# Patient Record
Sex: MALE | Race: WHITE | NOT HISPANIC OR LATINO
[De-identification: names, ages, dates, MRNs, and addresses within clinical notes are randomized per-mention and may not be internally consistent; named-entity substitution may affect disease eponyms.]

---

## 2017-12-25 ENCOUNTER — HOSPITAL (OUTPATIENT)
Dept: OTHER | Age: 42
End: 2017-12-25
Attending: INTERNAL MEDICINE

## 2017-12-25 LAB
ANALYZER ANC (IANC): NORMAL
ANION GAP SERPL CALC-SCNC: 13 MMOL/L (ref 10–20)
BASOPHILS # BLD: 0 THOUSAND/MCL (ref 0–0.3)
BASOPHILS NFR BLD: 0 %
BUN SERPL-MCNC: 17 MG/DL (ref 6–20)
BUN/CREAT SERPL: 19 (ref 7–25)
CALCIUM SERPL-MCNC: 8.6 MG/DL (ref 8.4–10.2)
CHLORIDE: 103 MMOL/L (ref 98–107)
CO2 SERPL-SCNC: 25 MMOL/L (ref 21–32)
CREAT SERPL-MCNC: 0.88 MG/DL (ref 0.67–1.17)
CRP SERPL-MCNC: 0.7 MG/DL
DIFFERENTIAL METHOD BLD: NORMAL
EOSINOPHIL # BLD: 0.3 THOUSAND/MCL (ref 0.1–0.5)
EOSINOPHIL NFR BLD: 5 %
ERYTHROCYTE [DISTWIDTH] IN BLOOD: 12.2 % (ref 11–15)
ERYTHROCYTE [SEDIMENTATION RATE] IN BLOOD BY WESTERGREN METHOD: 19 MM/HR (ref 0–20)
GLUCOSE BLDC GLUCOMTR-MCNC: 202 MG/DL (ref 65–99)
GLUCOSE SERPL-MCNC: 285 MG/DL (ref 65–99)
HEMATOCRIT: 40.8 % (ref 39–51)
HGB BLD-MCNC: 14.2 GM/DL (ref 13–17)
LYMPHOCYTES # BLD: 1.8 THOUSAND/MCL (ref 1–4.8)
LYMPHOCYTES NFR BLD: 32 %
MCH RBC QN AUTO: 30.1 PG (ref 26–34)
MCHC RBC AUTO-ENTMCNC: 34.8 GM/DL (ref 32–36.5)
MCV RBC AUTO: 86.6 FL (ref 78–100)
MONOCYTES # BLD: 0.4 THOUSAND/MCL (ref 0.3–0.9)
MONOCYTES NFR BLD: 7 %
NEUTROPHILS # BLD: 3.2 THOUSAND/MCL (ref 1.8–7.7)
NEUTROPHILS NFR BLD: 56 %
NEUTS SEG NFR BLD: NORMAL %
PERCENT NRBC: NORMAL
PLATELET # BLD: 171 THOUSAND/MCL (ref 140–450)
POTASSIUM SERPL-SCNC: 3.8 MMOL/L (ref 3.4–5.1)
RBC # BLD: 4.71 MILLION/MCL (ref 4.5–5.9)
SODIUM SERPL-SCNC: 137 MMOL/L (ref 135–145)
URATE SERPL-MCNC: 3.2 MG/DL (ref 3.5–7.2)
WBC # BLD: 5.7 THOUSAND/MCL (ref 4.2–11)

## 2024-09-04 ENCOUNTER — LAB (OUTPATIENT)
Dept: FAMILY MEDICINE CLINIC | Facility: CLINIC | Age: 49
End: 2024-09-04
Payer: COMMERCIAL

## 2024-09-04 ENCOUNTER — OFFICE VISIT (OUTPATIENT)
Dept: FAMILY MEDICINE CLINIC | Facility: CLINIC | Age: 49
End: 2024-09-04
Payer: COMMERCIAL

## 2024-09-04 VITALS
HEIGHT: 70 IN | WEIGHT: 202 LBS | SYSTOLIC BLOOD PRESSURE: 132 MMHG | DIASTOLIC BLOOD PRESSURE: 74 MMHG | HEART RATE: 85 BPM | OXYGEN SATURATION: 95 % | BODY MASS INDEX: 28.92 KG/M2

## 2024-09-04 DIAGNOSIS — M54.2 CERVICALGIA: ICD-10-CM

## 2024-09-04 DIAGNOSIS — N52.9 ERECTILE DYSFUNCTION, UNSPECIFIED ERECTILE DYSFUNCTION TYPE: ICD-10-CM

## 2024-09-04 DIAGNOSIS — Z12.11 SCREENING FOR MALIGNANT NEOPLASM OF COLON: ICD-10-CM

## 2024-09-04 DIAGNOSIS — E10.65 TYPE 1 DIABETES MELLITUS WITH HYPERGLYCEMIA: ICD-10-CM

## 2024-09-04 DIAGNOSIS — Z00.00 PREVENTATIVE HEALTH CARE: ICD-10-CM

## 2024-09-04 DIAGNOSIS — E78.2 MIXED HYPERLIPIDEMIA: Primary | ICD-10-CM

## 2024-09-04 DIAGNOSIS — M35.0C SJOGREN'S SYNDROME WITH DENTAL INVOLVEMENT: ICD-10-CM

## 2024-09-04 PROCEDURE — 99204 OFFICE O/P NEW MOD 45 MIN: CPT | Performed by: NURSE PRACTITIONER

## 2024-09-04 PROCEDURE — 86803 HEPATITIS C AB TEST: CPT | Performed by: NURSE PRACTITIONER

## 2024-09-04 PROCEDURE — 80061 LIPID PANEL: CPT | Performed by: NURSE PRACTITIONER

## 2024-09-04 PROCEDURE — 80050 GENERAL HEALTH PANEL: CPT | Performed by: NURSE PRACTITIONER

## 2024-09-04 PROCEDURE — 36415 COLL VENOUS BLD VENIPUNCTURE: CPT | Performed by: NURSE PRACTITIONER

## 2024-09-04 RX ORDER — INSULIN LISPRO 100 [IU]/ML
INJECTION, SOLUTION INTRAVENOUS; SUBCUTANEOUS
COMMUNITY
Start: 2024-07-26

## 2024-09-04 RX ORDER — EPINEPHRINE 0.3 MG/.3ML
INJECTION SUBCUTANEOUS
COMMUNITY
Start: 2024-07-25

## 2024-09-04 RX ORDER — PROCHLORPERAZINE 25 MG/1
SUPPOSITORY RECTAL
COMMUNITY
Start: 2024-07-25

## 2024-09-04 RX ORDER — TADALAFIL 20 MG/1
20 TABLET ORAL DAILY PRN
COMMUNITY
Start: 2024-07-26

## 2024-09-04 RX ORDER — HYDROCODONE BITARTRATE AND ACETAMINOPHEN 5; 325 MG/1; MG/1
1 TABLET ORAL
COMMUNITY
Start: 2024-08-29 | End: 2024-09-28

## 2024-09-04 RX ORDER — PREGABALIN 75 MG/1
75 CAPSULE ORAL
COMMUNITY
Start: 2024-07-30 | End: 2024-10-28

## 2024-09-04 RX ORDER — ATORVASTATIN CALCIUM 40 MG/1
40 TABLET, FILM COATED ORAL NIGHTLY
COMMUNITY
Start: 2024-07-24

## 2024-09-04 RX ORDER — PROCHLORPERAZINE 25 MG/1
SUPPOSITORY RECTAL
COMMUNITY
Start: 2024-07-03

## 2024-09-04 RX ORDER — SERTRALINE HYDROCHLORIDE 100 MG/1
TABLET, FILM COATED ORAL
COMMUNITY
Start: 2024-07-24

## 2024-09-04 RX ORDER — SEMAGLUTIDE 0.68 MG/ML
0.5 INJECTION, SOLUTION SUBCUTANEOUS
COMMUNITY

## 2024-09-04 NOTE — PROGRESS NOTES
"Chief Complaint  Establish Care (Moved to the area in June from [place] and needing a PCP. General check up)    Anamika Cooney presents to [facility]  History of Present Illness    Patient presents to establish care.  Past medical history includes Sjogren's syndrome, depression, type 1 diabetes and hyperlipidemia. Patient has Omnipod and taking Ozempic 0.5 mg weekly. Last A1C 7.8. He is followed by Endocrinology. Patient is taking Rituximab every 6 months. He is followed by Rheumatology with Ar. Patient is taking Lipitor 40 mg daily for hyperlipidemia. Hx pinched nerve to c-spine, taking Norco PRN and Lyrica 75 mg PRN. Patient followed by Dr. Castaneda and pain management. Depression and anxiety stable on Zoloft 150 mg daily. Hx ED, stable on Cialis PRN .    PHQ-9 Depression Screening  Little interest or pleasure in doing things? 0-->not at all   Feeling down, depressed, or hopeless? 0-->not at all   Trouble falling or staying asleep, or sleeping too much?     Feeling tired or having little energy?     Poor appetite or overeating?     Feeling bad about yourself - or that you are a failure or have let yourself or your family down?     Trouble concentrating on things, such as reading the newspaper or watching television?     Moving or speaking so slowly that other people could have noticed? Or the opposite - being so fidgety or restless that you have been moving around a lot more than usual?     Thoughts that you would be better off dead, or of hurting yourself in some way?     PHQ-9 Total Score 0   If you checked off any problems, how difficult have these problems made it for you to do your work, take care of things at home, or get along with other people?        Objective   Vital Signs:  /74 (BP Location: Left arm, Patient Position: Sitting, Cuff Size: Adult)   Pulse 85   Ht 177.8 cm (70\")   Wt 91.6 kg (202 lb)   SpO2 95%   BMI 28.98 kg/m²   Estimated body " "mass index is 28.98 kg/m² as calculated from the following:    Height as of this encounter: 177.8 cm (70\").    Weight as of this encounter: 91.6 kg (202 lb).    BMI is >= 25 and <30. (Overweight) The following options were offered after discussion;: exercise counseling/recommendations and nutrition counseling/recommendations      Physical Exam  Constitutional:       Appearance: Normal appearance.   HENT:      Head: Normocephalic.   Cardiovascular:      Rate and Rhythm: Normal rate and regular rhythm.   Pulmonary:      Effort: Pulmonary effort is normal.      Breath sounds: Normal breath sounds.   Abdominal:      General: Abdomen is flat. Bowel sounds are normal.      Palpations: Abdomen is soft.   Musculoskeletal:         General: Normal range of motion.      Cervical back: Neck supple.      Right lower leg: No edema.      Left lower leg: No edema.   Skin:     General: Skin is warm and dry.   Neurological:      Mental Status: He is alert and oriented to person, place, and time.      Gait: Gait is intact.   Psychiatric:         Attention and Perception: Attention normal.         Mood and Affect: Mood normal.         Speech: Speech normal.        Result Review :      Data reviewed : Consultant notes Endo/Pain management/Rheumatology           Assessment and Plan   Diagnoses and all orders for this visit:    1. Mixed hyperlipidemia (Primary)  Assessment & Plan:  Labs today  Continue Lipitor as prescribed      2. Erectile dysfunction, unspecified erectile dysfunction type  Assessment & Plan:  Cialis PRN      3. Type 1 diabetes mellitus with hyperglycemia  Assessment & Plan:  Continue Humalog and Ozempic per Endo  Visit note reviewed  ADA diet  Schedule eye exam      4. Sjogren's syndrome with dental involvement  Assessment & Plan:  Rituximab infusions per Rheumatology  Follow up as recommended      5. Preventative health care  -     CBC (No Diff)  -     Comprehensive Metabolic Panel  -     Lipid Panel  -     TSH  -     " Hepatitis C Antibody    6. Screening for malignant neoplasm of colon  -     Cologuard - Stool, Per Rectum; Future    7. Cervicalgia  Assessment & Plan:  Norco and Lyrica per pain management, follow up as instructed             I spent 30 minutes caring for Ji on this date of service. This time includes time spent by me in the following activities:preparing for the visit, reviewing tests, obtaining and/or reviewing a separately obtained history, performing a medically appropriate examination and/or evaluation , counseling and educating the patient/family/caregiver, ordering medications, tests, or procedures, documenting information in the medical record, and care coordination  Follow Up   Return in about 6 months (around 3/4/2025) for Annual physical.  Patient was given instructions and counseling regarding his condition or for health maintenance advice. Please see specific information pulled into the AVS if appropriate.

## 2024-09-05 LAB
ALBUMIN SERPL-MCNC: 4.1 G/DL (ref 3.5–5.2)
ALBUMIN/GLOB SERPL: 1.9 G/DL
ALP SERPL-CCNC: 128 U/L (ref 39–117)
ALT SERPL W P-5'-P-CCNC: 59 U/L (ref 1–41)
ANION GAP SERPL CALCULATED.3IONS-SCNC: 11 MMOL/L (ref 5–15)
AST SERPL-CCNC: 41 U/L (ref 1–40)
BILIRUB SERPL-MCNC: 0.3 MG/DL (ref 0–1.2)
BUN SERPL-MCNC: 14 MG/DL (ref 6–20)
BUN/CREAT SERPL: 15.1 (ref 7–25)
CALCIUM SPEC-SCNC: 9.2 MG/DL (ref 8.6–10.5)
CHLORIDE SERPL-SCNC: 103 MMOL/L (ref 98–107)
CHOLEST SERPL-MCNC: 145 MG/DL (ref 0–200)
CO2 SERPL-SCNC: 23 MMOL/L (ref 22–29)
CREAT SERPL-MCNC: 0.93 MG/DL (ref 0.76–1.27)
DEPRECATED RDW RBC AUTO: 44 FL (ref 37–54)
EGFRCR SERPLBLD CKD-EPI 2021: 101.9 ML/MIN/1.73
ERYTHROCYTE [DISTWIDTH] IN BLOOD BY AUTOMATED COUNT: 13 % (ref 12.3–15.4)
GLOBULIN UR ELPH-MCNC: 2.2 GM/DL
GLUCOSE SERPL-MCNC: 266 MG/DL (ref 65–99)
HCT VFR BLD AUTO: 45.2 % (ref 37.5–51)
HCV AB SER QL: NORMAL
HDLC SERPL-MCNC: 29 MG/DL (ref 40–60)
HGB BLD-MCNC: 14.6 G/DL (ref 13–17.7)
LDLC SERPL CALC-MCNC: 81 MG/DL (ref 0–100)
LDLC/HDLC SERPL: 2.56 {RATIO}
MCH RBC QN AUTO: 30 PG (ref 26.6–33)
MCHC RBC AUTO-ENTMCNC: 32.3 G/DL (ref 31.5–35.7)
MCV RBC AUTO: 93 FL (ref 79–97)
PLATELET # BLD AUTO: 220 10*3/MM3 (ref 140–450)
PMV BLD AUTO: 10.4 FL (ref 6–12)
POTASSIUM SERPL-SCNC: 4 MMOL/L (ref 3.5–5.2)
PROT SERPL-MCNC: 6.3 G/DL (ref 6–8.5)
RBC # BLD AUTO: 4.86 10*6/MM3 (ref 4.14–5.8)
SODIUM SERPL-SCNC: 137 MMOL/L (ref 136–145)
TRIGL SERPL-MCNC: 209 MG/DL (ref 0–150)
TSH SERPL DL<=0.05 MIU/L-ACNC: 1.15 UIU/ML (ref 0.27–4.2)
VLDLC SERPL-MCNC: 35 MG/DL (ref 5–40)
WBC NRBC COR # BLD AUTO: 4.71 10*3/MM3 (ref 3.4–10.8)

## 2024-09-05 NOTE — PROGRESS NOTES
Please let patient know that his triglycerides are little bit elevated with the remainder of his cholesterol panel looks good.  Continue Lipitor as prescribed as well as a healthy heart diet.  His liver enzymes are elevated.  Please ask him if he has had a liver ultrasound in the past.  The rest of his labs are normal

## 2024-09-06 DIAGNOSIS — R79.89 ELEVATED LFTS: Primary | ICD-10-CM

## 2024-09-19 ENCOUNTER — HOSPITAL ENCOUNTER (OUTPATIENT)
Dept: ULTRASOUND IMAGING | Facility: HOSPITAL | Age: 49
Discharge: HOME | End: 2024-09-19
Admitting: NURSE PRACTITIONER
Payer: COMMERCIAL

## 2024-09-19 DIAGNOSIS — R79.89 ELEVATED LFTS: ICD-10-CM

## 2024-09-19 PROCEDURE — 76705 ECHO EXAM OF ABDOMEN: CPT

## 2024-10-29 RX ORDER — SEMAGLUTIDE 0.68 MG/ML
0.5 INJECTION, SOLUTION SUBCUTANEOUS
OUTPATIENT
Start: 2024-10-29

## 2024-10-29 RX ORDER — SERTRALINE HYDROCHLORIDE 100 MG/1
150 TABLET, FILM COATED ORAL DAILY
Qty: 90 TABLET | Refills: 1 | Status: SHIPPED | OUTPATIENT
Start: 2024-10-29

## 2024-10-29 RX ORDER — TADALAFIL 20 MG/1
20 TABLET ORAL DAILY PRN
Qty: 90 TABLET | Refills: 0 | Status: SHIPPED | OUTPATIENT
Start: 2024-10-29

## 2024-12-13 RX ORDER — SERTRALINE HYDROCHLORIDE 100 MG/1
150 TABLET, FILM COATED ORAL DAILY
Qty: 135 TABLET | Refills: 1 | Status: SHIPPED | OUTPATIENT
Start: 2024-12-13

## 2024-12-13 RX ORDER — TADALAFIL 20 MG/1
20 TABLET ORAL DAILY PRN
Qty: 90 TABLET | Refills: 0 | Status: SHIPPED | OUTPATIENT
Start: 2024-12-13

## 2025-01-31 ENCOUNTER — OFFICE VISIT (OUTPATIENT)
Dept: FAMILY MEDICINE CLINIC | Facility: CLINIC | Age: 50
End: 2025-01-31
Payer: COMMERCIAL

## 2025-01-31 VITALS
WEIGHT: 202 LBS | OXYGEN SATURATION: 98 % | HEART RATE: 61 BPM | BODY MASS INDEX: 28.92 KG/M2 | SYSTOLIC BLOOD PRESSURE: 124 MMHG | DIASTOLIC BLOOD PRESSURE: 70 MMHG | HEIGHT: 70 IN

## 2025-01-31 DIAGNOSIS — F32.A ANXIETY AND DEPRESSION: Primary | ICD-10-CM

## 2025-01-31 DIAGNOSIS — F41.1 GAD (GENERALIZED ANXIETY DISORDER): ICD-10-CM

## 2025-01-31 DIAGNOSIS — Z00.00 PREVENTATIVE HEALTH CARE: ICD-10-CM

## 2025-01-31 DIAGNOSIS — Z23 NEED FOR INFLUENZA VACCINATION: ICD-10-CM

## 2025-01-31 DIAGNOSIS — F41.9 ANXIETY AND DEPRESSION: Primary | ICD-10-CM

## 2025-01-31 RX ORDER — ALPRAZOLAM 0.25 MG/1
0.25 TABLET ORAL DAILY PRN
Qty: 30 TABLET | Refills: 0 | Status: SHIPPED | OUTPATIENT
Start: 2025-01-31

## 2025-01-31 NOTE — ASSESSMENT & PLAN NOTE
Continue Zoloft 150 mg daily  Start Xanax 0.25 mg daily as needed for severe anxiety or panic  Follow-up on March 5 as scheduled  Call sooner if needed
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
## 2025-03-05 ENCOUNTER — OFFICE VISIT (OUTPATIENT)
Dept: FAMILY MEDICINE CLINIC | Facility: CLINIC | Age: 50
End: 2025-03-05
Payer: COMMERCIAL

## 2025-03-05 ENCOUNTER — LAB (OUTPATIENT)
Dept: FAMILY MEDICINE CLINIC | Facility: CLINIC | Age: 50
End: 2025-03-05
Payer: COMMERCIAL

## 2025-03-05 VITALS
OXYGEN SATURATION: 99 % | BODY MASS INDEX: 27.77 KG/M2 | HEIGHT: 70 IN | DIASTOLIC BLOOD PRESSURE: 70 MMHG | HEART RATE: 68 BPM | SYSTOLIC BLOOD PRESSURE: 124 MMHG | WEIGHT: 194 LBS

## 2025-03-05 DIAGNOSIS — Z00.00 ANNUAL PHYSICAL EXAM: ICD-10-CM

## 2025-03-05 DIAGNOSIS — F32.A ANXIETY AND DEPRESSION: Primary | ICD-10-CM

## 2025-03-05 DIAGNOSIS — F41.9 ANXIETY AND DEPRESSION: Primary | ICD-10-CM

## 2025-03-05 DIAGNOSIS — N52.9 ERECTILE DYSFUNCTION, UNSPECIFIED ERECTILE DYSFUNCTION TYPE: ICD-10-CM

## 2025-03-05 DIAGNOSIS — E78.2 MIXED HYPERLIPIDEMIA: ICD-10-CM

## 2025-03-05 DIAGNOSIS — E10.65 TYPE 1 DIABETES MELLITUS WITH HYPERGLYCEMIA: ICD-10-CM

## 2025-03-05 PROBLEM — F41.1 GAD (GENERALIZED ANXIETY DISORDER): Status: ACTIVE | Noted: 2025-03-05

## 2025-03-05 PROCEDURE — 82043 UR ALBUMIN QUANTITATIVE: CPT | Performed by: NURSE PRACTITIONER

## 2025-03-05 PROCEDURE — 80061 LIPID PANEL: CPT | Performed by: NURSE PRACTITIONER

## 2025-03-05 PROCEDURE — 82570 ASSAY OF URINE CREATININE: CPT | Performed by: NURSE PRACTITIONER

## 2025-03-05 PROCEDURE — 83036 HEMOGLOBIN GLYCOSYLATED A1C: CPT | Performed by: NURSE PRACTITIONER

## 2025-03-05 PROCEDURE — 80050 GENERAL HEALTH PANEL: CPT | Performed by: NURSE PRACTITIONER

## 2025-03-05 PROCEDURE — 36415 COLL VENOUS BLD VENIPUNCTURE: CPT | Performed by: NURSE PRACTITIONER

## 2025-03-05 RX ORDER — ATORVASTATIN CALCIUM 40 MG/1
40 TABLET, FILM COATED ORAL NIGHTLY
Qty: 90 TABLET | Refills: 3 | Status: SHIPPED | OUTPATIENT
Start: 2025-03-05

## 2025-03-05 NOTE — ASSESSMENT & PLAN NOTE
Patient's depression is a recurrent episode that is mild without psychosis. Depression is in partial remission and improving with treatment.    Plan:   Continue current medication therapy     Followup in 6 months.

## 2025-03-05 NOTE — ASSESSMENT & PLAN NOTE
Lipid abnormalities are improving with treatment    Plan:  Continue same medication/s without change.      Discussed medication dosage, use, side effects, and goals of treatment in detail.    Counseled patient on lifestyle modifications to help control hyperlipidemia.   Advised patient to exercise for 150 minutes weekly. (30 minute brisk walk, 5 days a week for example)    Patient Treatment Goals:   LDL goal is under 100    Followup  labs today .

## 2025-03-05 NOTE — ASSESSMENT & PLAN NOTE
Diabetes is improving with treatment.   Continue current treatment regimen.  Recommended an ADA diet.  Reminded to get yearly retinal exam.  Diabetes will be reassessed  per Endocrinology

## 2025-03-05 NOTE — PROGRESS NOTES
"Chief Complaint  Annual Exam and Follow-up (Follow up of anxiety/depression )    Anamika Cooney presents to [facility]  History of Present Illness    Patient presents for Annual Exam.  Medical history includes type 1 diabetes, depression, anxiety, Sjogren's syndrome and hyperlipidemia.Patient has Omnipod and taking Ozempic 1 mg weekly. Last A1C 8.1. He is followed by Endocrinology. Patient is taking Rituximab every 6 months for Sjogren's syndrome. He is followed by Rheumatology with Ar. Patient is taking Lipitor 40 mg daily for hyperlipidemia. Hx pinched nerve to c-spine, taking Norco PRN and Lyrica 75 mg PRN. Patient followed by Dr. Castaneda and pain management. Depression and anxiety stable on Zoloft 150 mg daily. Xanax recently added for PRN use as well.  Hx ED, stable on Cialis PRN .     PHQ-9 Depression Screening  Little interest or pleasure in doing things? Not at all   Feeling down, depressed, or hopeless? Not at all   PHQ-2 Total Score 0   Trouble falling or staying asleep, or sleeping too much?     Feeling tired or having little energy?     Poor appetite or overeating?     Feeling bad about yourself - or that you are a failure or have let yourself or your family down?     Trouble concentrating on things, such as reading the newspaper or watching television?     Moving or speaking so slowly that other people could have noticed? Or the opposite - being so fidgety or restless that you have been moving around a lot more than usual?     Thoughts that you would be better off dead, or of hurting yourself in some way?     PHQ-9 Total Score     If you checked off any problems, how difficult have these problems made it for you to do your work, take care of things at home, or get along with other people? Not difficult at all      Objective   Vital Signs:  /70 (BP Location: Left arm, Patient Position: Sitting, Cuff Size: Adult)   Pulse 68   Ht 177.8 cm (70\")   " "Wt 88 kg (194 lb)   SpO2 99%   BMI 27.84 kg/m²   Estimated body mass index is 27.84 kg/m² as calculated from the following:    Height as of this encounter: 177.8 cm (70\").    Weight as of this encounter: 88 kg (194 lb).          Physical Exam  Constitutional:       Appearance: Normal appearance.   HENT:      Head: Normocephalic.      Right Ear: Tympanic membrane normal.      Left Ear: Tympanic membrane normal.      Mouth/Throat:      Pharynx: Oropharynx is clear.   Cardiovascular:      Rate and Rhythm: Normal rate and regular rhythm.   Pulmonary:      Effort: Pulmonary effort is normal.      Breath sounds: Normal breath sounds.   Abdominal:      General: Abdomen is flat. Bowel sounds are normal.      Palpations: Abdomen is soft.   Musculoskeletal:         General: Normal range of motion.      Cervical back: Neck supple.      Right lower leg: No edema.      Left lower leg: No edema.   Skin:     General: Skin is warm and dry.   Neurological:      Mental Status: He is alert and oriented to person, place, and time.      Gait: Gait is intact.   Psychiatric:         Attention and Perception: Attention normal.         Mood and Affect: Mood normal.         Speech: Speech normal.        Result Review :                Assessment and Plan   Diagnoses and all orders for this visit:    1. Anxiety and depression (Primary)  Assessment & Plan:  Patient's depression is a recurrent episode that is mild without psychosis. Depression is in partial remission and improving with treatment.    Plan:   Continue current medication therapy     Followup in 6 months.       2. Type 1 diabetes mellitus with hyperglycemia  Assessment & Plan:  Diabetes is improving with treatment.   Continue current treatment regimen.  Recommended an ADA diet.  Reminded to get yearly retinal exam.  Diabetes will be reassessed  per Endocrinology    Orders:  -     Microalbumin / Creatinine Urine Ratio - Urine, Clean Catch  -     Hemoglobin A1c    3. Mixed " hyperlipidemia  Assessment & Plan:   Lipid abnormalities are improving with treatment    Plan:  Continue same medication/s without change.      Discussed medication dosage, use, side effects, and goals of treatment in detail.    Counseled patient on lifestyle modifications to help control hyperlipidemia.   Advised patient to exercise for 150 minutes weekly. (30 minute brisk walk, 5 days a week for example)    Patient Treatment Goals:   LDL goal is under 100    Followup  labs today .    Orders:  -     atorvastatin (LIPITOR) 40 MG tablet; Take 1 tablet by mouth Every Night.  Dispense: 90 tablet; Refill: 3  -     Lipid Panel    4. Erectile dysfunction, unspecified erectile dysfunction type  Assessment & Plan:  Stable on Cialis PRN      5. Annual physical exam  Assessment & Plan:  Labs today  Routine vision and dental screenings advised  Well balanced diet recommended  CDC recommends 150 minutes exercise weekly  Colon CA screening up to date    Orders:  -     CBC (No Diff)  -     Comprehensive Metabolic Panel  -     Hemoglobin A1c  -     Lipid Panel  -     TSH           I spent 30 minutes caring for Ji on this date of service. This time includes time spent by me in the following activities:preparing for the visit, reviewing tests, obtaining and/or reviewing a separately obtained history, performing a medically appropriate examination and/or evaluation , counseling and educating the patient/family/caregiver, ordering medications, tests, or procedures, documenting information in the medical record, and care coordination  Follow Up   Return in about 6 months (around 9/5/2025) for Anxiety.  Patient was given instructions and counseling regarding his condition or for health maintenance advice. Please see specific information pulled into the AVS if appropriate.     Counseling was given to patient for the following topics: diagnostic results, instructions for management, risk factor reductions, prognosis, patient and family  education, impressions, risks and benefits of treatment options, and importance of treatment compliance . Total time of the encounter was 25 minutes and 5 minutes was spent counseling.

## 2025-03-05 NOTE — ASSESSMENT & PLAN NOTE
Labs today  Routine vision and dental screenings advised  Well balanced diet recommended  CDC recommends 150 minutes exercise weekly  Colon CA screening up to date

## 2025-03-06 LAB
ALBUMIN SERPL-MCNC: 4.1 G/DL (ref 3.5–5.2)
ALBUMIN UR-MCNC: 2.3 MG/DL
ALBUMIN/GLOB SERPL: 1.5 G/DL
ALP SERPL-CCNC: 112 U/L (ref 39–117)
ALT SERPL W P-5'-P-CCNC: 21 U/L (ref 1–41)
ANION GAP SERPL CALCULATED.3IONS-SCNC: 14.7 MMOL/L (ref 5–15)
AST SERPL-CCNC: 20 U/L (ref 1–40)
BILIRUB SERPL-MCNC: 0.3 MG/DL (ref 0–1.2)
BUN SERPL-MCNC: 20 MG/DL (ref 6–20)
BUN/CREAT SERPL: 18.9 (ref 7–25)
CALCIUM SPEC-SCNC: 9.4 MG/DL (ref 8.6–10.5)
CHLORIDE SERPL-SCNC: 102 MMOL/L (ref 98–107)
CHOLEST SERPL-MCNC: 208 MG/DL (ref 0–200)
CO2 SERPL-SCNC: 22.3 MMOL/L (ref 22–29)
CREAT SERPL-MCNC: 1.06 MG/DL (ref 0.76–1.27)
CREAT UR-MCNC: 307.2 MG/DL
DEPRECATED RDW RBC AUTO: 44.8 FL (ref 37–54)
EGFRCR SERPLBLD CKD-EPI 2021: 87.1 ML/MIN/1.73
ERYTHROCYTE [DISTWIDTH] IN BLOOD BY AUTOMATED COUNT: 13.1 % (ref 12.3–15.4)
GLOBULIN UR ELPH-MCNC: 2.7 GM/DL
GLUCOSE SERPL-MCNC: 170 MG/DL (ref 65–99)
HBA1C MFR BLD: 9.2 % (ref 4.8–5.6)
HCT VFR BLD AUTO: 50.3 % (ref 37.5–51)
HDLC SERPL-MCNC: 32 MG/DL (ref 40–60)
HGB BLD-MCNC: 16.6 G/DL (ref 13–17.7)
LDLC SERPL CALC-MCNC: 149 MG/DL (ref 0–100)
LDLC/HDLC SERPL: 4.56 {RATIO}
MCH RBC QN AUTO: 30.6 PG (ref 26.6–33)
MCHC RBC AUTO-ENTMCNC: 33 G/DL (ref 31.5–35.7)
MCV RBC AUTO: 92.6 FL (ref 79–97)
MICROALBUMIN/CREAT UR: 7.5 MG/G (ref 0–29)
PLATELET # BLD AUTO: 228 10*3/MM3 (ref 140–450)
PMV BLD AUTO: 11.3 FL (ref 6–12)
POTASSIUM SERPL-SCNC: 4 MMOL/L (ref 3.5–5.2)
PROT SERPL-MCNC: 6.8 G/DL (ref 6–8.5)
RBC # BLD AUTO: 5.43 10*6/MM3 (ref 4.14–5.8)
SODIUM SERPL-SCNC: 139 MMOL/L (ref 136–145)
TRIGL SERPL-MCNC: 150 MG/DL (ref 0–150)
TSH SERPL DL<=0.05 MIU/L-ACNC: 0.95 UIU/ML (ref 0.27–4.2)
VLDLC SERPL-MCNC: 27 MG/DL (ref 5–40)
WBC NRBC COR # BLD AUTO: 7.12 10*3/MM3 (ref 3.4–10.8)

## 2025-03-10 ENCOUNTER — RESULTS FOLLOW-UP (OUTPATIENT)
Dept: FAMILY MEDICINE CLINIC | Facility: CLINIC | Age: 50
End: 2025-03-10
Payer: COMMERCIAL

## 2025-03-14 DIAGNOSIS — M25.511 CHRONIC RIGHT SHOULDER PAIN: Primary | ICD-10-CM

## 2025-03-14 DIAGNOSIS — G89.29 CHRONIC RIGHT SHOULDER PAIN: Primary | ICD-10-CM

## 2025-03-25 DIAGNOSIS — F41.1 GAD (GENERALIZED ANXIETY DISORDER): ICD-10-CM

## 2025-03-25 RX ORDER — ALPRAZOLAM 0.25 MG
0.25 TABLET ORAL DAILY PRN
Qty: 30 TABLET | Refills: 0 | Status: SHIPPED | OUTPATIENT
Start: 2025-03-25

## 2025-04-01 ENCOUNTER — OFFICE VISIT (OUTPATIENT)
Dept: ORTHOPEDIC SURGERY | Facility: CLINIC | Age: 50
End: 2025-04-01
Payer: COMMERCIAL

## 2025-04-01 VITALS — BODY MASS INDEX: 27.77 KG/M2 | HEIGHT: 70 IN | WEIGHT: 194 LBS | OXYGEN SATURATION: 98 %

## 2025-04-01 DIAGNOSIS — G89.29 CHRONIC RIGHT SHOULDER PAIN: Primary | ICD-10-CM

## 2025-04-01 DIAGNOSIS — M25.511 CHRONIC RIGHT SHOULDER PAIN: Primary | ICD-10-CM

## 2025-04-01 NOTE — PROGRESS NOTES
Ji is a 49 y.o. year old male presents to [facility]    Chief Complaint   Patient presents with    Right Shoulder - Initial Evaluation     Pain for a few months, no known injury.       History of Present Illness  Ji Cooney is a right-handed 49 y.o. male   History of Present Illness  The patient presents for evaluation of chronic right shoulder pain.    He has been experiencing persistent right shoulder pain for several months, with no specific incident to attribute as the cause. He recalls playing softball during the summer after a significant hiatus and suspects overexertion may have contributed to the onset of his symptoms. The pain has escalated to the point where he can only lift his arm above his shoulder, with any other movement resulting in severe discomfort. He describes the pain as shooting, akin to a lightning bolt, extending down his arm beyond the elbow. This pain is not limited to specific activities but can occur during routine tasks such as handing an object to his daughter while driving. His ability to engage in sports activities such as baseball and basketball has been significantly compromised, as well as his activities of daily living. He also reports a sensation of his arm being pulled out during certain movements, followed by persistent throbbing. As a right-handed individual, this pain has been particularly debilitating. He recalls experiencing similar pain during his softball games last year, but it was not as intense. Despite attempts to alleviate the pain through icing and heating, there has been no significant improvement. He has also tried to avoid maintaining the same position for extended periods, but the pain remains constant. He has been managing the pain with Tylenol, as ibuprofen exacerbates his Sjogren's syndrome, as well as is contraindicated due to his diabetes. He has also trialed a round of formal physical therapy that has not provided any  "relief. The pain is present even at rest and is particularly noticeable at night, making it uncomfortable for him to lie on his right side.    Supplemental Information  He has diabetes.    MEDICATIONS  Current: Tylenol  NSAIDs contraindicated due to diabetes and Sjogren's.    Diabetic, last A1c 9.20 on 3/5/2025    I have reviewed the patient's medical, family, and social history in detail and updated the computerized patient record.    Objective:  Ht 177.8 cm (70\")   Wt 88 kg (194 lb)   SpO2 98%   BMI 27.84 kg/m²      Physical Exam    Vital signs reviewed.   General: No acute distress.  Eyes: conjunctiva clear  ENT: external ears atraumatic  CV: no peripheral edema  Resp: normal respiratory effort  Skin: no rashes or wounds; normal turgor  Psych: mood and affect appropriate; recent and remote memory intact  Neuro: sensation to light touch intact    MSK Exam    Right shoulder:  Physical Exam  Right radial pulse in the upper extremity is 1+. Capillary refill is less than 1 second to all digits of the right hand. Sensation to light touch is intact in all distributions of bilateral extremities.  strength is 5/5 bilaterally. Range of motion at the right digits, right wrist, and right elbow is grossly intact. A Dexcom patch is present on the right deltoid. Right shoulder appears intact upon visual inspection with no warmth, apparent effusion, atrophy, ecchymosis, or signs of infection. No areas of tenderness are noted. Passive forward flexion is 130 with pain and mild crepitus. Passive abduction is 80 with pain. No stiffness is observed. Passive external rotation is 40 with pain. Active internal rotation is S1. Positive Chavez test result. Positive Speed test result. Belly press is 5/5 with pain. Lift off is 5/5 with pain. No weakness with Roane test, but mild pain is present. No weakness with supraspinatus/Fei test, but pain is present. Positive apprehension test result on the right.    Imaging:  XR Shoulder " 2+ View Right (04/01/2025 09:03)     Assessment:  Diagnoses and all orders for this visit:    Chronic right shoulder pain  -     XR Shoulder 2+ View Right  -     MRI Shoulder Right Without Contrast; Future    Plan:   Assessment & Plan  1. Right shoulder pain.  The patient's right shoulder pain has been present for a few months, exacerbated by activities such as playing softball and volleyball. The pain is described as excruciating with certain movements and is accompanied by shooting pain down the arm. Physical examination reveals pain with passive forward flexion, abduction, and external rotation, as well as positive Chavez and Speed tests. X-rays show mild arthritis in the AC joint and humeral head but no significant bone spurs or calcific tendinitis. The pain is likely due to inflammation of the rotator cuff tendons rather than a substantial tear. Given the patient's elevated A1c level of 9.2, a steroid injection is not recommended due to the risk of hyperglycemia. An MRI will be ordered to further evaluate the condition. He will follow-up with Dr. Dickey for evaluation of the MRI.  All questions answered        Patient or patient representative verbalized consent for the use of Ambient Listening during the visit with  Sreekanth Moreira PA-C for chart documentation. 4/2/2025  09:55 EDT    Follow Up   Return for MRI f/u jose Dickey.  Patient was given instructions and counseling regarding his condition or for health maintenance advice. Please see specific information pulled into the AVS if appropriate.     EMR Dragon/Transcription disclaimer:    Much of this encounter note is an electronic transcription/translation of spoken language to printed text.  The electronic translation of spoken language may permit erroneous, or at times, nonsensical words or phrases to be inadvertently transcribed.  Although I have reviewed the note for such errors some may still exist.

## 2025-04-02 ENCOUNTER — PATIENT ROUNDING (BHMG ONLY) (OUTPATIENT)
Dept: ORTHOPEDIC SURGERY | Facility: CLINIC | Age: 50
End: 2025-04-02
Payer: COMMERCIAL

## 2025-04-22 ENCOUNTER — HOSPITAL ENCOUNTER (OUTPATIENT)
Dept: MRI IMAGING | Facility: HOSPITAL | Age: 50
Discharge: HOME | End: 2025-04-22
Admitting: PHYSICIAN ASSISTANT
Payer: COMMERCIAL

## 2025-04-22 DIAGNOSIS — M25.511 CHRONIC RIGHT SHOULDER PAIN: ICD-10-CM

## 2025-04-22 DIAGNOSIS — G89.29 CHRONIC RIGHT SHOULDER PAIN: ICD-10-CM

## 2025-04-22 PROCEDURE — 73221 MRI JOINT UPR EXTREM W/O DYE: CPT

## 2025-05-05 ENCOUNTER — TELEPHONE (OUTPATIENT)
Dept: ORTHOPEDIC SURGERY | Facility: CLINIC | Age: 50
End: 2025-05-05
Payer: COMMERCIAL

## 2025-05-05 NOTE — TELEPHONE ENCOUNTER
Hub staff attempted to follow warm transfer process and was unsuccessful     Caller: Ji Cooney    Relationship to patient: Self    Best call back number: [phone] (home)       Patient is needing: APPT TO F/U WITH DR CALLE FOR  MRI RESULTS (SHOULDER TEAR)  WANTS TO BE SEEN BEFORE HUB FIRST AVAILABLE WHICH IS 6.5.25

## 2025-05-15 ENCOUNTER — PREP FOR SURGERY (OUTPATIENT)
Dept: OTHER | Facility: HOSPITAL | Age: 50
End: 2025-05-15
Payer: COMMERCIAL

## 2025-05-15 ENCOUNTER — OFFICE VISIT (OUTPATIENT)
Dept: ORTHOPEDIC SURGERY | Facility: CLINIC | Age: 50
End: 2025-05-15
Payer: COMMERCIAL

## 2025-05-15 VITALS — WEIGHT: 194 LBS | HEIGHT: 70 IN | HEART RATE: 82 BPM | BODY MASS INDEX: 27.77 KG/M2

## 2025-05-15 DIAGNOSIS — M75.121 COMPLETE TEAR OF RIGHT ROTATOR CUFF, UNSPECIFIED WHETHER TRAUMATIC: Primary | ICD-10-CM

## 2025-05-15 PROCEDURE — 99214 OFFICE O/P EST MOD 30 MIN: CPT | Performed by: ORTHOPAEDIC SURGERY

## 2025-05-15 PROCEDURE — 97760 ORTHOTIC MGMT&TRAING 1ST ENC: CPT | Performed by: ORTHOPAEDIC SURGERY

## 2025-05-15 NOTE — PROGRESS NOTES
"     Patient ID: Ji Cooney is a 50 y.o. male.    Chief Complaint:    Chief Complaint   Patient presents with    Right Shoulder - Pain, Initial Evaluation     Pain 5        HPI:  This is a 50-year-old gentleman here with several months of right shoulder pain.  He felt like it may have began after playing some softball.  He has developed now near constant pain deep in the shoulder especially at night.  Hurts to reach overhead or behind his back.  He is taken oral medication tried to do rehab exercises also using ice or heat all without relief.  No prior shoulder surgery  Past Medical History:   Diagnosis Date    Anxiety     Diabetes mellitus     History of medical problems January 2023    Sjogren's Disease    Rotator cuff syndrome 8/1/24    Progressively getting worse    Sjogren's disease        Past Surgical History:   Procedure Laterality Date    FOOT SURGERY  7/21    Tendon repair    TENDON REPAIR Right     right foot       Family History   Problem Relation Age of Onset    Diabetes Maternal Grandfather     Diabetes Paternal Grandfather     Anxiety disorder Sister         She has dealt with these for a few years          Social History     Occupational History    Not on file   Tobacco Use    Smoking status: Never     Passive exposure: Never    Smokeless tobacco: Never   Vaping Use    Vaping status: Never Used   Substance and Sexual Activity    Alcohol use: Never    Drug use: Never    Sexual activity: Yes     Partners: Female     Birth control/protection: Birth control pill      Review of Systems   Cardiovascular:  Negative for chest pain.   Musculoskeletal:  Positive for arthralgias.       Objective:    Pulse 82   Ht 177.8 cm (70\")   Wt 88 kg (194 lb)   BMI 27.84 kg/m²     Physical Examination:  Right shoulder demonstrates intact skin mild pain in the impingement area passive elevation 160 abduction 130 external Titian 40 internal rotation L5 with pain and weakness on Speed Barnwell supraspinatus testing.  Belly " press and liftoff are 5/5 and 4/5.  Sensory and motor exam are intact all distributions. Radial pulse is palpable and capillary refill is less than two seconds to all digits.    Imaging:  Prior x-ray and MRI reviewed demonstrate well-maintained joint spaces high-grade near full-thickness supraspinatus tear    Assessment:    Right shoulder pain full-thickness cuff tear  Plan:  Further conservative or surgical options discussed he would like to proceed with right shoulder arthroscopy rotator cuff repair  Risks and benefits, specifically risks of bleeding, scar, infection, fracture, stiffness, retear, nerve, tendon or artery damage, the need for further surgery, DVT, and loss of life or limb and rehab were discussed. All questions were answered and addressed.  Postop sling dispensed  Greater than 15 minutes was spent demonstrating proper fit and use of the device and signs to monitor for complications      Procedures         Disclaimer: Part of this note may be an electronic transcription/translation of spoken language to printed text using the Dragon Dictation System

## 2025-05-15 NOTE — H&P (VIEW-ONLY)
"     Patient ID: Ji Cooney is a 50 y.o. male.    Chief Complaint:    Chief Complaint   Patient presents with    Right Shoulder - Pain, Initial Evaluation     Pain 5        HPI:  This is a 50-year-old gentleman here with several months of right shoulder pain.  He felt like it may have began after playing some softball.  He has developed now near constant pain deep in the shoulder especially at night.  Hurts to reach overhead or behind his back.  He is taken oral medication tried to do rehab exercises also using ice or heat all without relief.  No prior shoulder surgery  Past Medical History:   Diagnosis Date    Anxiety     Diabetes mellitus     History of medical problems January 2023    Sjogren's Disease    Rotator cuff syndrome 8/1/24    Progressively getting worse    Sjogren's disease        Past Surgical History:   Procedure Laterality Date    FOOT SURGERY  7/21    Tendon repair    TENDON REPAIR Right     right foot       Family History   Problem Relation Age of Onset    Diabetes Maternal Grandfather     Diabetes Paternal Grandfather     Anxiety disorder Sister         She has dealt with these for a few years          Social History     Occupational History    Not on file   Tobacco Use    Smoking status: Never     Passive exposure: Never    Smokeless tobacco: Never   Vaping Use    Vaping status: Never Used   Substance and Sexual Activity    Alcohol use: Never    Drug use: Never    Sexual activity: Yes     Partners: Female     Birth control/protection: Birth control pill      Review of Systems   Cardiovascular:  Negative for chest pain.   Musculoskeletal:  Positive for arthralgias.       Objective:    Pulse 82   Ht 177.8 cm (70\")   Wt 88 kg (194 lb)   BMI 27.84 kg/m²     Physical Examination:  Right shoulder demonstrates intact skin mild pain in the impingement area passive elevation 160 abduction 130 external Titian 40 internal rotation L5 with pain and weakness on Speed Putnam supraspinatus testing.  Belly " press and liftoff are 5/5 and 4/5.  Sensory and motor exam are intact all distributions. Radial pulse is palpable and capillary refill is less than two seconds to all digits.    Imaging:  Prior x-ray and MRI reviewed demonstrate well-maintained joint spaces high-grade near full-thickness supraspinatus tear    Assessment:    Right shoulder pain full-thickness cuff tear  Plan:  Further conservative or surgical options discussed he would like to proceed with right shoulder arthroscopy rotator cuff repair  Risks and benefits, specifically risks of bleeding, scar, infection, fracture, stiffness, retear, nerve, tendon or artery damage, the need for further surgery, DVT, and loss of life or limb and rehab were discussed. All questions were answered and addressed.  Postop sling dispensed  Greater than 15 minutes was spent demonstrating proper fit and use of the device and signs to monitor for complications      Procedures         Disclaimer: Part of this note may be an electronic transcription/translation of spoken language to printed text using the Dragon Dictation System   Normal for race

## 2025-05-16 ENCOUNTER — PATIENT ROUNDING (BHMG ONLY) (OUTPATIENT)
Dept: ORTHOPEDIC SURGERY | Facility: CLINIC | Age: 50
End: 2025-05-16
Payer: COMMERCIAL

## 2025-05-16 RX ORDER — SEMAGLUTIDE 1.34 MG/ML
1 INJECTION, SOLUTION SUBCUTANEOUS WEEKLY
COMMUNITY
Start: 2025-05-05

## 2025-05-19 ENCOUNTER — LAB (OUTPATIENT)
Dept: LAB | Facility: HOSPITAL | Age: 50
End: 2025-05-19
Payer: COMMERCIAL

## 2025-05-19 ENCOUNTER — HOSPITAL ENCOUNTER (OUTPATIENT)
Dept: CARDIOLOGY | Facility: HOSPITAL | Age: 50
Discharge: HOME | End: 2025-05-19
Payer: COMMERCIAL

## 2025-05-19 DIAGNOSIS — M75.121 COMPLETE TEAR OF RIGHT ROTATOR CUFF, UNSPECIFIED WHETHER TRAUMATIC: ICD-10-CM

## 2025-05-19 LAB
ABO GROUP BLD: NORMAL
ANION GAP SERPL CALCULATED.3IONS-SCNC: 11.8 MMOL/L (ref 5–15)
APTT PPP: 24.3 SECONDS (ref 22.7–35.4)
BASOPHILS # BLD AUTO: 0 10*3/MM3 (ref 0–0.2)
BASOPHILS NFR BLD AUTO: 0 % (ref 0–1.5)
BLD GP AB SCN SERPL QL: NEGATIVE
BUN SERPL-MCNC: 11 MG/DL (ref 6–20)
BUN/CREAT SERPL: 11.6 (ref 7–25)
CALCIUM SPEC-SCNC: 9 MG/DL (ref 8.6–10.5)
CHLORIDE SERPL-SCNC: 106 MMOL/L (ref 98–107)
CO2 SERPL-SCNC: 23.2 MMOL/L (ref 22–29)
CREAT SERPL-MCNC: 0.95 MG/DL (ref 0.76–1.27)
DEPRECATED RDW RBC AUTO: 42.5 FL (ref 37–54)
EGFRCR SERPLBLD CKD-EPI 2021: 98.7 ML/MIN/1.73
EOSINOPHIL # BLD AUTO: 0.14 10*3/MM3 (ref 0–0.4)
EOSINOPHIL NFR BLD AUTO: 3.4 % (ref 0.3–6.2)
ERYTHROCYTE [DISTWIDTH] IN BLOOD BY AUTOMATED COUNT: 12.3 % (ref 12.3–15.4)
GLUCOSE SERPL-MCNC: 149 MG/DL (ref 65–99)
HCT VFR BLD AUTO: 46.5 % (ref 37.5–51)
HGB BLD-MCNC: 15.3 G/DL (ref 13–17.7)
IMM GRANULOCYTES # BLD AUTO: 0.01 10*3/MM3 (ref 0–0.05)
IMM GRANULOCYTES NFR BLD AUTO: 0.2 % (ref 0–0.5)
INR PPP: 0.99 (ref 0.9–1.1)
LYMPHOCYTES # BLD AUTO: 0.89 10*3/MM3 (ref 0.7–3.1)
LYMPHOCYTES NFR BLD AUTO: 21.5 % (ref 19.6–45.3)
MCH RBC QN AUTO: 30.7 PG (ref 26.6–33)
MCHC RBC AUTO-ENTMCNC: 32.9 G/DL (ref 31.5–35.7)
MCV RBC AUTO: 93.4 FL (ref 79–97)
MONOCYTES # BLD AUTO: 0.35 10*3/MM3 (ref 0.1–0.9)
MONOCYTES NFR BLD AUTO: 8.5 % (ref 5–12)
NEUTROPHILS NFR BLD AUTO: 2.74 10*3/MM3 (ref 1.7–7)
NEUTROPHILS NFR BLD AUTO: 66.4 % (ref 42.7–76)
NRBC BLD AUTO-RTO: 0 /100 WBC (ref 0–0.2)
PLATELET # BLD AUTO: 207 10*3/MM3 (ref 140–450)
PMV BLD AUTO: 10.7 FL (ref 6–12)
POTASSIUM SERPL-SCNC: 3.9 MMOL/L (ref 3.5–5.2)
PROTHROMBIN TIME: 13 SECONDS (ref 11.7–14.2)
QT INTERVAL: 388 MS
QTC INTERVAL: 425 MS
RBC # BLD AUTO: 4.98 10*6/MM3 (ref 4.14–5.8)
RH BLD: POSITIVE
SODIUM SERPL-SCNC: 141 MMOL/L (ref 136–145)
T&S EXPIRATION DATE: NORMAL
WBC NRBC COR # BLD AUTO: 4.13 10*3/MM3 (ref 3.4–10.8)

## 2025-05-19 PROCEDURE — 85730 THROMBOPLASTIN TIME PARTIAL: CPT

## 2025-05-19 PROCEDURE — 86900 BLOOD TYPING SEROLOGIC ABO: CPT

## 2025-05-19 PROCEDURE — 36415 COLL VENOUS BLD VENIPUNCTURE: CPT

## 2025-05-19 PROCEDURE — 86850 RBC ANTIBODY SCREEN: CPT

## 2025-05-19 PROCEDURE — 85025 COMPLETE CBC W/AUTO DIFF WBC: CPT

## 2025-05-19 PROCEDURE — 80048 BASIC METABOLIC PNL TOTAL CA: CPT

## 2025-05-19 PROCEDURE — 86901 BLOOD TYPING SEROLOGIC RH(D): CPT

## 2025-05-19 PROCEDURE — 93005 ELECTROCARDIOGRAM TRACING: CPT | Performed by: ORTHOPAEDIC SURGERY

## 2025-05-19 PROCEDURE — 85610 PROTHROMBIN TIME: CPT

## 2025-05-29 RX ORDER — CEPHALEXIN 500 MG/1
500 CAPSULE ORAL 4 TIMES DAILY
Qty: 4 CAPSULE | Refills: 0 | Status: SHIPPED | OUTPATIENT
Start: 2025-05-29 | End: 2025-05-30

## 2025-05-29 RX ORDER — PROMETHAZINE HYDROCHLORIDE 12.5 MG/1
12.5 TABLET ORAL EVERY 6 HOURS PRN
Qty: 21 TABLET | Refills: 1 | Status: SHIPPED | OUTPATIENT
Start: 2025-05-29

## 2025-05-29 RX ORDER — OXYCODONE AND ACETAMINOPHEN 5; 325 MG/1; MG/1
1 TABLET ORAL EVERY 6 HOURS PRN
Qty: 28 TABLET | Refills: 0 | Status: SHIPPED | OUTPATIENT
Start: 2025-05-29 | End: 2025-06-02 | Stop reason: SDUPTHER

## 2025-05-30 ENCOUNTER — ANESTHESIA (OUTPATIENT)
Dept: PERIOP | Facility: HOSPITAL | Age: 50
End: 2025-05-30
Payer: COMMERCIAL

## 2025-05-30 ENCOUNTER — ANESTHESIA EVENT CONVERTED (OUTPATIENT)
Dept: ANESTHESIOLOGY | Facility: HOSPITAL | Age: 50
End: 2025-05-30
Payer: COMMERCIAL

## 2025-05-30 ENCOUNTER — HOSPITAL ENCOUNTER (OUTPATIENT)
Facility: HOSPITAL | Age: 50
Setting detail: HOSPITAL OUTPATIENT SURGERY
Discharge: HOME | End: 2025-05-30
Attending: ORTHOPAEDIC SURGERY | Admitting: ORTHOPAEDIC SURGERY
Payer: COMMERCIAL

## 2025-05-30 ENCOUNTER — ANESTHESIA EVENT (OUTPATIENT)
Dept: PERIOP | Facility: HOSPITAL | Age: 50
End: 2025-05-30
Payer: COMMERCIAL

## 2025-05-30 VITALS
OXYGEN SATURATION: 96 % | DIASTOLIC BLOOD PRESSURE: 66 MMHG | SYSTOLIC BLOOD PRESSURE: 104 MMHG | HEART RATE: 58 BPM | TEMPERATURE: 97.8 F | RESPIRATION RATE: 12 BRPM

## 2025-05-30 DIAGNOSIS — M75.121 COMPLETE TEAR OF RIGHT ROTATOR CUFF, UNSPECIFIED WHETHER TRAUMATIC: Primary | ICD-10-CM

## 2025-05-30 LAB
GLUCOSE BLDC GLUCOMTR-MCNC: 151 MG/DL (ref 70–105)
GLUCOSE BLDC GLUCOMTR-MCNC: 96 MG/DL (ref 70–105)

## 2025-05-30 PROCEDURE — 82948 REAGENT STRIP/BLOOD GLUCOSE: CPT

## 2025-05-30 PROCEDURE — 25010000002 ONDANSETRON PER 1 MG: Performed by: NURSE ANESTHETIST, CERTIFIED REGISTERED

## 2025-05-30 PROCEDURE — 82948 REAGENT STRIP/BLOOD GLUCOSE: CPT | Performed by: ORTHOPAEDIC SURGERY

## 2025-05-30 PROCEDURE — 25810000003 LACTATED RINGERS PER 1000 ML: Performed by: NURSE ANESTHETIST, CERTIFIED REGISTERED

## 2025-05-30 PROCEDURE — 25010000002 SUCCINYLCHOLINE PER 20 MG: Performed by: NURSE ANESTHETIST, CERTIFIED REGISTERED

## 2025-05-30 PROCEDURE — C1713 ANCHOR/SCREW BN/BN,TIS/BN: HCPCS | Performed by: ORTHOPAEDIC SURGERY

## 2025-05-30 PROCEDURE — 25010000002 LIDOCAINE 1% - EPINEPHRINE 1:100000 1 %-1:100000 SOLUTION 20 ML VIAL: Performed by: ORTHOPAEDIC SURGERY

## 2025-05-30 PROCEDURE — 25010000002 KETOROLAC TROMETHAMINE PER 15 MG: Performed by: ORTHOPAEDIC SURGERY

## 2025-05-30 PROCEDURE — 25010000002 SUGAMMADEX 200 MG/2ML SOLUTION: Performed by: NURSE ANESTHETIST, CERTIFIED REGISTERED

## 2025-05-30 PROCEDURE — 25010000002 EPINEPHRINE PER 0.1 MG: Performed by: ORTHOPAEDIC SURGERY

## 2025-05-30 PROCEDURE — 25010000002 DIPHENHYDRAMINE PER 50 MG: Performed by: ANESTHESIOLOGY

## 2025-05-30 PROCEDURE — 25010000002 LIDOCAINE PF 1% 1 % SOLUTION: Performed by: NURSE ANESTHETIST, CERTIFIED REGISTERED

## 2025-05-30 PROCEDURE — 25010000002 BUPIVACAINE (PF) 0.25 % SOLUTION 30 ML VIAL: Performed by: ORTHOPAEDIC SURGERY

## 2025-05-30 PROCEDURE — 25010000002 PROPOFOL 10 MG/ML EMULSION: Performed by: NURSE ANESTHETIST, CERTIFIED REGISTERED

## 2025-05-30 PROCEDURE — 25010000002 CEFAZOLIN PER 500 MG: Performed by: ORTHOPAEDIC SURGERY

## 2025-05-30 PROCEDURE — 25010000002 FENTANYL CITRATE (PF) 50 MCG/ML SOLUTION: Performed by: ANESTHESIOLOGY

## 2025-05-30 PROCEDURE — 25010000002 FENTANYL CITRATE (PF) 100 MCG/2ML SOLUTION: Performed by: NURSE ANESTHETIST, CERTIFIED REGISTERED

## 2025-05-30 PROCEDURE — 25010000002 FAMOTIDINE 10 MG/ML SOLUTION: Performed by: ANESTHESIOLOGY

## 2025-05-30 PROCEDURE — 25010000002 ROPIVACAINE PER 1 MG: Performed by: ANESTHESIOLOGY

## 2025-05-30 PROCEDURE — 25010000002 DEXAMETHASONE PER 1 MG: Performed by: ANESTHESIOLOGY

## 2025-05-30 PROCEDURE — 25010000002 LIDOCAINE 1 % SOLUTION 10 ML VIAL: Performed by: ORTHOPAEDIC SURGERY

## 2025-05-30 PROCEDURE — 25010000002 GLYCOPYRROLATE 0.2 MG/ML SOLUTION: Performed by: NURSE ANESTHETIST, CERTIFIED REGISTERED

## 2025-05-30 PROCEDURE — 25010000002 MIDAZOLAM PER 1 MG: Performed by: ANESTHESIOLOGY

## 2025-05-30 PROCEDURE — 25010000002 DIPHENHYDRAMINE PER 50 MG: Performed by: NURSE ANESTHETIST, CERTIFIED REGISTERED

## 2025-05-30 DEVICE — 4.75 MM ARGO KNOTLESS GENESYS ANCHOR
Type: IMPLANTABLE DEVICE | Site: SHOULDER | Status: FUNCTIONAL
Brand: ARGO KNOTLESS GENESYS

## 2025-05-30 DEVICE — 2MM HI-FI® TAPE BLUE
Type: IMPLANTABLE DEVICE | Site: SHOULDER | Status: FUNCTIONAL
Brand: HI-FI

## 2025-05-30 DEVICE — HI-FI® PASSING LOOP
Type: IMPLANTABLE DEVICE | Site: SHOULDER | Status: FUNCTIONAL
Brand: HI-FI

## 2025-05-30 RX ORDER — OXYCODONE HYDROCHLORIDE 5 MG/1
5 TABLET ORAL ONCE AS NEEDED
Refills: 0 | Status: DISCONTINUED | OUTPATIENT
Start: 2025-05-30 | End: 2025-05-30 | Stop reason: HOSPADM

## 2025-05-30 RX ORDER — LIDOCAINE HYDROCHLORIDE 10 MG/ML
INJECTION, SOLUTION EPIDURAL; INFILTRATION; INTRACAUDAL; PERINEURAL AS NEEDED
Status: DISCONTINUED | OUTPATIENT
Start: 2025-05-30 | End: 2025-05-30 | Stop reason: SURG

## 2025-05-30 RX ORDER — FENTANYL CITRATE 50 UG/ML
INJECTION, SOLUTION INTRAMUSCULAR; INTRAVENOUS
Status: COMPLETED | OUTPATIENT
Start: 2025-05-30 | End: 2025-05-30

## 2025-05-30 RX ORDER — FENTANYL CITRATE 50 UG/ML
INJECTION, SOLUTION INTRAMUSCULAR; INTRAVENOUS AS NEEDED
Status: DISCONTINUED | OUTPATIENT
Start: 2025-05-30 | End: 2025-05-30 | Stop reason: SURG

## 2025-05-30 RX ORDER — SODIUM CHLORIDE, SODIUM LACTATE, POTASSIUM CHLORIDE, CALCIUM CHLORIDE 600; 310; 30; 20 MG/100ML; MG/100ML; MG/100ML; MG/100ML
INJECTION, SOLUTION INTRAVENOUS CONTINUOUS PRN
Status: DISCONTINUED | OUTPATIENT
Start: 2025-05-30 | End: 2025-05-30 | Stop reason: SURG

## 2025-05-30 RX ORDER — NALOXONE HCL 0.4 MG/ML
0.4 VIAL (ML) INJECTION AS NEEDED
Status: DISCONTINUED | OUTPATIENT
Start: 2025-05-30 | End: 2025-05-30 | Stop reason: HOSPADM

## 2025-05-30 RX ORDER — MIDAZOLAM HYDROCHLORIDE 1 MG/ML
INJECTION, SOLUTION INTRAMUSCULAR; INTRAVENOUS
Status: COMPLETED | OUTPATIENT
Start: 2025-05-30 | End: 2025-05-30

## 2025-05-30 RX ORDER — DEXAMETHASONE SODIUM PHOSPHATE 4 MG/ML
INJECTION, SOLUTION INTRA-ARTICULAR; INTRALESIONAL; INTRAMUSCULAR; INTRAVENOUS; SOFT TISSUE
Status: COMPLETED | OUTPATIENT
Start: 2025-05-30 | End: 2025-05-30

## 2025-05-30 RX ORDER — ONDANSETRON 2 MG/ML
4 INJECTION INTRAMUSCULAR; INTRAVENOUS ONCE AS NEEDED
Status: DISCONTINUED | OUTPATIENT
Start: 2025-05-30 | End: 2025-05-30 | Stop reason: HOSPADM

## 2025-05-30 RX ORDER — EPHEDRINE SULFATE 5 MG/ML
5 INJECTION INTRAVENOUS ONCE AS NEEDED
Status: DISCONTINUED | OUTPATIENT
Start: 2025-05-30 | End: 2025-05-30 | Stop reason: HOSPADM

## 2025-05-30 RX ORDER — KETOROLAC TROMETHAMINE 30 MG/ML
INJECTION, SOLUTION INTRAMUSCULAR; INTRAVENOUS
Status: DISCONTINUED
Start: 2025-05-30 | End: 2025-05-30 | Stop reason: HOSPADM

## 2025-05-30 RX ORDER — FAMOTIDINE 10 MG/ML
20 INJECTION, SOLUTION INTRAVENOUS ONCE
Status: COMPLETED | OUTPATIENT
Start: 2025-05-30 | End: 2025-05-30

## 2025-05-30 RX ORDER — HYDRALAZINE HYDROCHLORIDE 20 MG/ML
5 INJECTION INTRAMUSCULAR; INTRAVENOUS
Status: DISCONTINUED | OUTPATIENT
Start: 2025-05-30 | End: 2025-05-30 | Stop reason: HOSPADM

## 2025-05-30 RX ORDER — PROPOFOL 10 MG/ML
VIAL (ML) INTRAVENOUS AS NEEDED
Status: DISCONTINUED | OUTPATIENT
Start: 2025-05-30 | End: 2025-05-30 | Stop reason: SURG

## 2025-05-30 RX ORDER — EPHEDRINE SULFATE 5 MG/ML
INJECTION INTRAVENOUS AS NEEDED
Status: DISCONTINUED | OUTPATIENT
Start: 2025-05-30 | End: 2025-05-30 | Stop reason: SURG

## 2025-05-30 RX ORDER — ROCURONIUM BROMIDE 10 MG/ML
INJECTION, SOLUTION INTRAVENOUS AS NEEDED
Status: DISCONTINUED | OUTPATIENT
Start: 2025-05-30 | End: 2025-05-30 | Stop reason: SURG

## 2025-05-30 RX ORDER — OXYCODONE HYDROCHLORIDE 5 MG/1
10 TABLET ORAL EVERY 4 HOURS PRN
Refills: 0 | Status: DISCONTINUED | OUTPATIENT
Start: 2025-05-30 | End: 2025-05-30 | Stop reason: HOSPADM

## 2025-05-30 RX ORDER — HYDROMORPHONE HYDROCHLORIDE 1 MG/ML
0.5 INJECTION, SOLUTION INTRAMUSCULAR; INTRAVENOUS; SUBCUTANEOUS
Refills: 0 | Status: DISCONTINUED | OUTPATIENT
Start: 2025-05-30 | End: 2025-05-30 | Stop reason: HOSPADM

## 2025-05-30 RX ORDER — LIDOCAINE HYDROCHLORIDE AND EPINEPHRINE 10; 10 MG/ML; UG/ML
INJECTION, SOLUTION INFILTRATION; PERINEURAL
Status: DISCONTINUED
Start: 2025-05-30 | End: 2025-05-30 | Stop reason: HOSPADM

## 2025-05-30 RX ORDER — ROPIVACAINE HYDROCHLORIDE 5 MG/ML
INJECTION, SOLUTION EPIDURAL; INFILTRATION; PERINEURAL
Status: COMPLETED | OUTPATIENT
Start: 2025-05-30 | End: 2025-05-30

## 2025-05-30 RX ORDER — MEPERIDINE HYDROCHLORIDE 25 MG/ML
12.5 INJECTION INTRAMUSCULAR; INTRAVENOUS; SUBCUTANEOUS
Status: DISCONTINUED | OUTPATIENT
Start: 2025-05-30 | End: 2025-05-30 | Stop reason: HOSPADM

## 2025-05-30 RX ORDER — DIPHENHYDRAMINE HYDROCHLORIDE 50 MG/ML
12.5 INJECTION, SOLUTION INTRAMUSCULAR; INTRAVENOUS
Status: DISCONTINUED | OUTPATIENT
Start: 2025-05-30 | End: 2025-05-30 | Stop reason: HOSPADM

## 2025-05-30 RX ORDER — GLYCOPYRROLATE 0.2 MG/ML
INJECTION INTRAMUSCULAR; INTRAVENOUS AS NEEDED
Status: DISCONTINUED | OUTPATIENT
Start: 2025-05-30 | End: 2025-05-30 | Stop reason: SURG

## 2025-05-30 RX ORDER — DIPHENHYDRAMINE HYDROCHLORIDE 50 MG/ML
12.5 INJECTION, SOLUTION INTRAMUSCULAR; INTRAVENOUS ONCE AS NEEDED
Status: DISCONTINUED | OUTPATIENT
Start: 2025-05-30 | End: 2025-05-30 | Stop reason: HOSPADM

## 2025-05-30 RX ORDER — PHENYLEPHRINE HCL IN 0.9% NACL 1 MG/10 ML
SYRINGE (ML) INTRAVENOUS AS NEEDED
Status: DISCONTINUED | OUTPATIENT
Start: 2025-05-30 | End: 2025-05-30 | Stop reason: SURG

## 2025-05-30 RX ORDER — IPRATROPIUM BROMIDE AND ALBUTEROL SULFATE 2.5; .5 MG/3ML; MG/3ML
3 SOLUTION RESPIRATORY (INHALATION) ONCE AS NEEDED
Status: DISCONTINUED | OUTPATIENT
Start: 2025-05-30 | End: 2025-05-30 | Stop reason: HOSPADM

## 2025-05-30 RX ORDER — SUCCINYLCHOLINE CHLORIDE 20 MG/ML
INJECTION INTRAMUSCULAR; INTRAVENOUS AS NEEDED
Status: DISCONTINUED | OUTPATIENT
Start: 2025-05-30 | End: 2025-05-30 | Stop reason: SURG

## 2025-05-30 RX ORDER — FLUMAZENIL 0.1 MG/ML
0.2 INJECTION INTRAVENOUS AS NEEDED
Status: DISCONTINUED | OUTPATIENT
Start: 2025-05-30 | End: 2025-05-30 | Stop reason: HOSPADM

## 2025-05-30 RX ORDER — DIPHENHYDRAMINE HYDROCHLORIDE 50 MG/ML
25 INJECTION, SOLUTION INTRAMUSCULAR; INTRAVENOUS ONCE
Status: COMPLETED | OUTPATIENT
Start: 2025-05-30 | End: 2025-05-30

## 2025-05-30 RX ORDER — ONDANSETRON 2 MG/ML
INJECTION INTRAMUSCULAR; INTRAVENOUS AS NEEDED
Status: DISCONTINUED | OUTPATIENT
Start: 2025-05-30 | End: 2025-05-30 | Stop reason: SURG

## 2025-05-30 RX ORDER — LABETALOL HYDROCHLORIDE 5 MG/ML
5 INJECTION, SOLUTION INTRAVENOUS
Status: DISCONTINUED | OUTPATIENT
Start: 2025-05-30 | End: 2025-05-30 | Stop reason: HOSPADM

## 2025-05-30 RX ADMIN — MIDAZOLAM 2 MG: 1 INJECTION INTRAMUSCULAR; INTRAVENOUS at 12:43

## 2025-05-30 RX ADMIN — SUCCINYLCHOLINE CHLORIDE 160 MG: 20 INJECTION, SOLUTION INTRAMUSCULAR; INTRAVENOUS at 13:40

## 2025-05-30 RX ADMIN — SODIUM CHLORIDE, SODIUM LACTATE, POTASSIUM CHLORIDE, AND CALCIUM CHLORIDE: .6; .31; .03; .02 INJECTION, SOLUTION INTRAVENOUS at 13:35

## 2025-05-30 RX ADMIN — GLYCOPYRROLATE 0.2 MG: 0.2 INJECTION INTRAMUSCULAR; INTRAVENOUS at 13:52

## 2025-05-30 RX ADMIN — DIPHENHYDRAMINE HYDROCHLORIDE 25 MG: 50 INJECTION INTRAMUSCULAR; INTRAVENOUS at 13:25

## 2025-05-30 RX ADMIN — FENTANYL CITRATE 100 MCG: 50 INJECTION, SOLUTION INTRAMUSCULAR; INTRAVENOUS at 13:40

## 2025-05-30 RX ADMIN — EPHEDRINE SULFATE 10 MG: 5 INJECTION INTRAVENOUS at 13:49

## 2025-05-30 RX ADMIN — ONDANSETRON 4 MG: 2 INJECTION INTRAMUSCULAR; INTRAVENOUS at 14:04

## 2025-05-30 RX ADMIN — SUGAMMADEX 200 MG: 100 INJECTION, SOLUTION INTRAVENOUS at 14:27

## 2025-05-30 RX ADMIN — PROPOFOL 200 MG: 10 INJECTION, EMULSION INTRAVENOUS at 13:40

## 2025-05-30 RX ADMIN — FENTANYL CITRATE 100 MCG: 50 INJECTION, SOLUTION INTRAMUSCULAR; INTRAVENOUS at 12:43

## 2025-05-30 RX ADMIN — EPHEDRINE SULFATE 5 MG: 5 INJECTION INTRAVENOUS at 14:06

## 2025-05-30 RX ADMIN — DEXAMETHASONE SODIUM PHOSPHATE 4 MG: 4 INJECTION, SOLUTION INTRAMUSCULAR; INTRAVENOUS at 14:04

## 2025-05-30 RX ADMIN — ROPIVACAINE HYDROCHLORIDE 30 ML: 5 INJECTION EPIDURAL; INFILTRATION; PERINEURAL at 12:43

## 2025-05-30 RX ADMIN — CEFAZOLIN 2 G: 2 INJECTION, POWDER, FOR SOLUTION INTRAMUSCULAR; INTRAVENOUS at 13:29

## 2025-05-30 RX ADMIN — ROCURONIUM BROMIDE 10 MG: 10 INJECTION, SOLUTION INTRAVENOUS at 13:40

## 2025-05-30 RX ADMIN — EPHEDRINE SULFATE 10 MG: 5 INJECTION INTRAVENOUS at 14:15

## 2025-05-30 RX ADMIN — DEXAMETHASONE SODIUM PHOSPHATE 4 MG: 4 INJECTION, SOLUTION INTRAMUSCULAR; INTRAVENOUS at 12:43

## 2025-05-30 RX ADMIN — DIPHENHYDRAMINE HYDROCHLORIDE 12.5 MG: 50 INJECTION, SOLUTION INTRAMUSCULAR; INTRAVENOUS at 15:02

## 2025-05-30 RX ADMIN — FAMOTIDINE 20 MG: 10 INJECTION, SOLUTION INTRAVENOUS at 15:41

## 2025-05-30 RX ADMIN — EPHEDRINE SULFATE 10 MG: 5 INJECTION INTRAVENOUS at 13:45

## 2025-05-30 RX ADMIN — Medication 100 MCG: at 13:47

## 2025-05-30 RX ADMIN — ROCURONIUM BROMIDE 40 MG: 10 INJECTION, SOLUTION INTRAVENOUS at 13:48

## 2025-05-30 RX ADMIN — LIDOCAINE HYDROCHLORIDE 50 MG: 10 INJECTION, SOLUTION EPIDURAL; INFILTRATION; INTRACAUDAL; PERINEURAL at 13:40

## 2025-05-30 NOTE — ANESTHESIA POSTPROCEDURE EVALUATION
Patient: Ji Cooney    Procedure Summary       Date: 05/30/25 Room / Location: [facility] OR 04 / [facility] MAIN OR    Anesthesia Start: 1335 Anesthesia Stop: 1441    Procedure: shoulder arthroscopy with rotator cuff repair (Right: Shoulder) Diagnosis:       Complete tear of right rotator cuff, unspecified whether traumatic      (Complete tear of right rotator cuff, unspecified whether traumatic [M75.121])    Surgeons: Gregory Dickey MD Provider: Gene Lawson MD    Anesthesia Type: general ASA Status: 2            Anesthesia Type: general    Vitals  Vitals Value Taken Time   /61 05/30/25 15:15   Temp 97.7 °F (36.5 °C) 05/30/25 14:39   Pulse 75 05/30/25 15:18   Resp 13 05/30/25 15:09   SpO2 95 % 05/30/25 15:18   Vitals shown include unfiled device data.        Post Anesthesia Care and Evaluation    Patient location during evaluation: PACU  Patient participation: complete - patient participated  Level of consciousness: awake  Pain scale: See nurse's notes for pain score.  Pain management: adequate    Airway patency: patent  Anesthetic complications: No anesthetic complications  PONV Status: none  Cardiovascular status: acceptable  Respiratory status: acceptable and spontaneous ventilation  Hydration status: acceptable    Comments: Patient seen and examined postoperatively; vital signs stable; SpO2 greater than or equal to 90%; cardiopulmonary status stable; nausea/vomiting adequately controlled; pain adequately controlled; no apparent anesthesia complications; patient discharged from anesthesia care when discharge criteria were met

## 2025-05-30 NOTE — ANESTHESIA PROCEDURE NOTES
Peripheral Block      Patient reassessed immediately prior to procedure    Patient location during procedure: pre-op  Start time: 5/30/2025 12:38 PM  Stop time: 5/30/2025 12:43 PM  Reason for block: procedure for pain, at surgeon's request, post-op pain management and secondary anesthetic  Performed by  Anesthesiologist: Gene Lawson MD  Preanesthetic Checklist  Completed: patient identified, IV checked, site marked, risks and benefits discussed, surgical consent, monitors and equipment checked, pre-op evaluation and timeout performed  Prep:  Pt Position: sitting  Sterile barriers:cap, gloves, mask and washed/disinfected hands  Prep: ChloraPrep  Patient monitoring: blood pressure monitoring, continuous pulse oximetry and EKG  Procedure    Sedation: yes  Performed under: local infiltration  Guidance:ultrasound guided and landmark technique    ULTRASOUND INTERPRETATION.  Using ultrasound guidance a 22 G gauge needle was placed in close proximity to the nerve, at which point, under ultrasound guidance anesthetic was injected in the area of the nerve and spread of the anesthesia was seen on ultrasound in close proximity thereto.  There were no abnormalities seen on ultrasound; a digital image was taken; and the patient tolerated the procedure with no complications. Images:still images obtained, printed/placed on chart    Laterality:right  Block Type:supraclavicular  Injection Technique:single-shot  Needle Type:echogenic  Needle Gauge:22 G  Resistance on Injection: less than 15 psi  Sedation medications used: midazolam (VERSED) injection - Intravenous   2 mg - 5/30/2025 12:43:00 PM  fentaNYL citrate (PF) (SUBLIMAZE) injection - Intravenous   100 mcg - 5/30/2025 12:43:00 PM  Medications Used: dexamethasone (DECADRON) injection - Injection   4 mg - 5/30/2025 12:43:00 PM  ropivacaine (NAROPIN) 0.5 % injection - Perineural   30 mL - 5/30/2025 12:43:00 PM      Medications  Comment:Joel well    Post Assessment  Injection  Assessment: negative aspiration for heme, no paresthesia on injection and incremental injection  Patient Tolerance:comfortable throughout block  Complications:no  Additional Notes  Pre-procedure:  Peripheral nerve block performed preoperatively prior to the start of anesthesia time at the request of the patient and the surgeon for the management of postoperative acute surgical pain as well as for a secondary intraoperative anesthetic (general anesthesia is the primary intraoperative anesthetic); patient identified; pre-procedure vital signs, all relevant labs/studies, complete medical/surgical/anesthetic history, full medication list, full allergy list, and NPO status obtained/reviewed; physical assessment performed; anesthetic options, side effects, potential complications, risks, and benefits discussed; questions answered; patient wishes to proceed with the procedure; written anesthesia procedure consent obtained; patient cleared for procedure; time out performed; IV access in situ    Procedure:  ASA monitor placed; supplemental oxygen provided; patient positioned; hand hygiene performed; sterile technique maintained throughout the procedure; sterile prep applied; insertion site determined by anatomical landmarks, palpation, and ultrasound imaging; live ultrasound guidance throughout the procedure; target nerves/landmarks identified on live ultrasound; skin and subcutaneous tissues numbed by injection of 1% lidocaine; 2 inch 22G Flavours Ultra 360 Insulated Echogenic Needle used; realtime needle advancement and placement near the target nerves witnessed on live ultrasound; negative aspiration prior to injection; correct needle placement confirmed on live ultrasound by local anesthetic spread around the target nerves; local anesthetic mixture injected with negative aspiration prior to each injection and after each 1-5 mL injected; needle withdrawn; dressing applied; ultrasound image printed and placed in the  patient's permanent medical record    Post-procedure:  Peripheral nerve block placed successfully; good block; no apparent complications; minimal estimated blood loss; vital signs stable throughout; see nurse's notes for vitals; transported to the OR, general anesthesia induced, and surgery started  Performed by: Gene Lawson MD

## 2025-05-30 NOTE — ANESTHESIA PROCEDURE NOTES
Airway  Reason: elective    Date/Time: 5/30/2025 1:42 PM    General Information and Staff    Patient location during procedure: OR  CRNA/CAA: Johanna Strong CRNA    Indications and Patient Condition  Indications for airway management: airway protection    Preoxygenated: yes    Mask difficulty assessment: 0 - not attempted    Final Airway Details    Final airway type: endotracheal airway      Successful airway: ETT  Cuffed: yes   Successful intubation technique: video laryngoscopy and RSI  Adjuncts used in placement: intubating stylet  Endotracheal tube insertion site: oral  Blade: Varma  Blade size: 4  ETT size (mm): 7.5  Cormack-Lehane Classification: grade I - full view of glottis  Placement verified by: chest auscultation and capnometry   Measured from: lips  ETT/EBT  to lips (cm): 22  Number of attempts at approach: 1  Assessment: lips, teeth, and gum same as pre-op and atraumatic intubation

## 2025-05-30 NOTE — ANESTHESIA PREPROCEDURE EVALUATION
Anesthesia Evaluation     Patient summary reviewed and Nursing notes reviewed   NPO Solid Status: > 8 hours  NPO Liquid Status: > 8 hours           Airway   Mallampati: II  TM distance: >3 FB  Neck ROM: full  No difficulty expected  Dental - normal exam     Pulmonary - normal exam   Cardiovascular - normal exam    ECG reviewed    (+) hyperlipidemia      Neuro/Psych  (+) psychiatric history  GI/Hepatic/Renal/Endo    (+) diabetes mellitus    Musculoskeletal     (+) neck pain  Abdominal  - normal exam    Bowel sounds: normal.   Substance History      OB/GYN          Other        ROS/Med Hx Other: Glp-1      SR                    Anesthesia Plan    ASA 2     general   Rapid sequence  intravenous induction     Anesthetic plan, risks, benefits, and alternatives have been provided, discussed and informed consent has been obtained with: patient.    Plan discussed with CRNA.        CODE STATUS:

## 2025-06-02 ENCOUNTER — OFFICE VISIT (OUTPATIENT)
Dept: ORTHOPEDIC SURGERY | Facility: CLINIC | Age: 50
End: 2025-06-02
Payer: COMMERCIAL

## 2025-06-02 VITALS — BODY MASS INDEX: 27.77 KG/M2 | WEIGHT: 194 LBS | HEIGHT: 70 IN | HEART RATE: 92 BPM

## 2025-06-02 DIAGNOSIS — M75.121 COMPLETE TEAR OF RIGHT ROTATOR CUFF, UNSPECIFIED WHETHER TRAUMATIC: Primary | ICD-10-CM

## 2025-06-02 DIAGNOSIS — Z47.89 ORTHOPEDIC AFTERCARE: Primary | ICD-10-CM

## 2025-06-02 DIAGNOSIS — M75.121 COMPLETE TEAR OF RIGHT ROTATOR CUFF, UNSPECIFIED WHETHER TRAUMATIC: ICD-10-CM

## 2025-06-02 PROCEDURE — 99024 POSTOP FOLLOW-UP VISIT: CPT | Performed by: ORTHOPAEDIC SURGERY

## 2025-06-02 RX ORDER — OXYCODONE AND ACETAMINOPHEN 5; 325 MG/1; MG/1
1 TABLET ORAL EVERY 6 HOURS PRN
Qty: 28 TABLET | Refills: 0 | Status: SHIPPED | OUTPATIENT
Start: 2025-06-03

## 2025-06-02 RX ORDER — OXYCODONE AND ACETAMINOPHEN 5; 325 MG/1; MG/1
1 TABLET ORAL EVERY 6 HOURS PRN
Qty: 28 TABLET | Refills: 0 | Status: SHIPPED | OUTPATIENT
Start: 2025-06-02

## 2025-06-02 NOTE — OP NOTE
SHOULDER ARTHROSCOPY WITH ROTATOR CUFF REPAIR  Procedure Report    Patient Name:  Ji Cooney  YOB: 1975    Date of Surgery:  5/30/2025     Indications: This is a 50 y.o. male with pain to the right shoulder.  Imaging demonstrated rotator cuff tear.Treatment options were discussed.  They desired to proceed with shoulder arthroscopy with rotator cuff repair after discussing the risks including bleeding, scarring, infection, stiffness, nerve damage, tendon damage, artery damage, continued pain, DVT, loss of life or limb, and a need for further surgery.      Pre-op Diagnosis:   Complete tear of right rotator cuff, unspecified whether traumatic [M75.121]       Post-op Diagnosis:    Post-Op Diagnosis Codes:     * Complete tear of right rotator cuff, unspecified whether traumatic [M75.121]    Procedure/CPT® Codes: 23330    Procedure(s): Right  shoulder arthroscopy with rotator cuff repair    Assistant: Sreekanth Moreira physician assistant    was responsible for performing the following activities: Retraction, Suction, Irrigation, Suturing, Closing, and Placing Dressing and their skilled assistance was necessary for the success of this case.         Anesthesia: General with Block    IV fluids: See anesthesia record    Estimated Blood Loss: minimal    Implants:    Implant Name Type Inv. Item Serial No.  Lot No. LRB No. Used Action   SUT LP NONABS CUFFLINK HIFI NMBR2 - [id number] Implant SUT LP NONABS CUFFLINK HIFI NMBR2  [employer] [id number] Right 1 Implanted   SUT TPE HIFI NONABS RUPAL 2MM - [id number] Implant SUT TPE HIFI NONABS RUPAL 2MM  [employer] [id number] Right 1 Implanted   SUT/ANCH KNOTLSS LUIS GENSYS NMBR2/HI/FI/SUT 4.75MM - [id number] Implant SUT/ANCH KNOTLSS LUIS GENSYS NMBR2/HI/FI/SUT 4.75MM  [employer] [id number] Right 1 Implanted         Complications: None    Specimens:none    Description of Procedure: The patient's operative site was marked.  Regional anesthesia was administered.   They were brought to the operating room and placed  on the operating room table.  General anesthesia was administered. Antibiotics were dosed.  A timeout was taken, confirming the correct operative site and procedure.  Exam under anesthesia indicated full range of motion and no instability.  They were placed in a semilateral position.  An axillary roll and SCDs were placed.  The right shoulder was prepped and draped in the standard surgical fashion.  The shoulder was injected with local anesthetic and was placed into traction.  A posterior portal was created.  A camera was inserted.  The glenoid chondral surface was intact.  The humerus surface was intact.  The subscapularis was intact.  The biceps was intact.  The labrum was intact.  The undersurface of the cuff demonstrated high-grade supraspinatus tear. A shaver was inserted.  The labrum was probed and intact.  The biceps was pulled into the shoulder and found to be intact.  The axillary pouch was free of synovitis or loose bodies. The instruments were placed in the subacromial space.  A full-thickness bursectomy was performed.  The CA ligament was intact.  No impingement was noted.  An accessory portal was created. Tear was visualized and prepared.  It was a high-grade articular sided tear after tear completion measured 1.5 cm crescent shape tear.  Tuberosity skeletonized and a microfracture performed.   Fiber tape suture as well as a fiber link suture were used to create a mattress and ripstop configuration. These were secured to a anchor off the lateral edge of the tuberosity restoring the tendon to its footprint. .  The instruments were removed.  The wounds were closed with suture and Steri-Strips.    30 mg of Toradol and lidocaine were injected into the deltoid  and a sterile dressing was applied to the rest of the shoulder.  They were placed in a sling and taken to the recovery room.  There were no complications.  I was present for all portions.  All counts  were correct.  Good capillary refill was noted to the hand.      Gregory Dickey MD     Date: 6/2/2025  Time: 08:07 EDT

## 2025-06-02 NOTE — PATIENT INSTRUCTIONS
Shoulder Arthroscopy: Post- Operative Visit Objectives    Review the operative findings, procedures and photos.  Make sure medications are effective and not causing problems.  Pain: Oxycodone or hydrocodone is for pain. You may take 1 tablet every 6 hours as necessary.  Some patients don’t require the use of these…in those circumstances just use Tylenol extra-strength 1 or 2 tablets every 4-6 hours.   Naproxen 500 mg. For pain and inflammation.  You should take 1 every twice a day.  Do not use Aleve, Ibuprofen, Motrin or Advil during this time.  If you have had any problems stop taking these medicines and please tell us!  Keflex (cephalexin). This is an antibiotic to be taken as a preventive medicine.  Take 1 pill every 6 hours for 1 day. If you have a penicillin allergy this will be replaced by other options.  Wound Care:  Today we will change your dressings and cover your wounds with a plastic covering called Tegaderm. This will allow you to shower immediately.  Remove the tegaderm and underlying dressings in two weeks then ok to get wet in a shower. No Baths or swimming until 4 weeks after surgery.  Keep a towel on the dressing while applying ice.  Exercises and Physical Therapy Remember the protocol is 3 phases:  Healing (6 wks)  Continue the use of the sling for 6 weeks; depending on procedure utilized this may be shorter. You can do gentle range of motion exercise of the elbow and wrist immediately with the arm at the side.  Formal physical therapy will usually start in two weeks.    Rehabilitative (6 wks) You begin a more aggressive phase of physical therapy at the 6 week shyla. Light strengthening and a continued emphasis on protecting the repair are important at this stage.  Restorative (4 wks)  Back to your sports and job activities gradually   Follow Up appointments Schedule Follow up visits as directed usually in 4 weeks. Physical therapy will be ordered today and you should receive a phone call or can  schedule yourself if appropriate.  Notes  Make sure you have all necessary notes and documentation for school or work.  Issues: Remember our goal is to make this process smooth and easy if there is any thing you need please ask us or call back [phone]

## 2025-06-02 NOTE — PROGRESS NOTES
Patient ID: Ji Cooney is a 50 y.o. male.    5/30/25 right shoulder arthroscopy supraspinatus repair  Pain moderate      Objective:    There were no vitals taken for this visit.    Physical Examination:  Wounds are well approximated without infection.  Sensory and motor exam are intact in all distributions. Radial pulse is palpable and capillary refill is less than two seconds to all digits.       Imaging:      Assessment:  Doing well after cuff repair    Plan:  Wounds were cleaned and redressed. Restrictions discussed.  Begin therapy and see me in 4 weeks.  Remove dressings in two weeks.

## 2025-06-16 ENCOUNTER — TREATMENT (OUTPATIENT)
Dept: PHYSICAL THERAPY | Facility: CLINIC | Age: 50
End: 2025-06-16
Payer: COMMERCIAL

## 2025-06-16 DIAGNOSIS — Z47.89 ORTHOPEDIC AFTERCARE: ICD-10-CM

## 2025-06-16 DIAGNOSIS — M25.511 ACUTE PAIN OF RIGHT SHOULDER: ICD-10-CM

## 2025-06-16 DIAGNOSIS — M75.121 COMPLETE TEAR OF RIGHT ROTATOR CUFF, UNSPECIFIED WHETHER TRAUMATIC: ICD-10-CM

## 2025-06-16 DIAGNOSIS — M75.101 NONTRAUMATIC TEAR OF RIGHT ROTATOR CUFF, UNSPECIFIED TEAR EXTENT: Primary | ICD-10-CM

## 2025-06-16 PROCEDURE — 97535 SELF CARE MNGMENT TRAINING: CPT | Performed by: PHYSICAL THERAPIST

## 2025-06-16 PROCEDURE — 97110 THERAPEUTIC EXERCISES: CPT | Performed by: PHYSICAL THERAPIST

## 2025-06-16 PROCEDURE — 97140 MANUAL THERAPY 1/> REGIONS: CPT | Performed by: PHYSICAL THERAPIST

## 2025-06-16 PROCEDURE — 97161 PT EVAL LOW COMPLEX 20 MIN: CPT | Performed by: PHYSICAL THERAPIST

## 2025-06-16 NOTE — PROGRESS NOTES
Physical Therapy Initial Evaluation and Plan of Care    Patient: Ji Cooney   : 1975  Diagnosis/ICD-10 Code:  Nontraumatic tear of right rotator cuff, unspecified tear extent [M75.101]  Referring practitioner: Gregory Dickey, *  Outcome Measure: QuickDASH: ADL: 65%, Work: 56.25%, Sports:100%    Diagnoses and all orders for this visit:    1. Nontraumatic tear of right rotator cuff, unspecified tear extent (Primary)    2. Acute pain of right shoulder         Subjective Evaluation    History of Present Illness  Mechanism of injury: Pt reports to therapy with s/p R RCT repair on 25. Pt reports that he was playing softball about a year ago where he threw the ball and  injured his R shoulder, but never went to the doctor because he attributed the pain to old age. After about a year of pain with activities like reaching for the tv remote, he decided to see the doctor and was told his rotator cuff was 90% torn the pt reported. Pt states that sleep is uncomfortable, but he can sleep in a recliner or supine in bed with pillows behind him to prop him up. Pt does feel pain down the R arm and feels the need to stretch his arm out, but does not experience any numbness or tingling. Pt expressed his concerns for how soon he could get his bandages off.      Aggravating factors: movement    Alleviating factors: ice, medication (tylenol)      Patient Occupation:  Quality of life: good    Pain  Current pain ratin  At best pain rating: 3  At worst pain ratin    Hand dominance: right    Patient Goals  Patient goals for therapy: increased motion, decreased pain, increased strength, independence with ADLs/IADLs, return to sport/leisure activities and improved balance           Objective          Postural Observations  Seated posture: good  Standing posture: good      Observations     Right Shoulder  Positive for incision.       Neurological Testing     Sensation     Shoulder   Left Shoulder   Intact: light  touch    Right Shoulder   Intact: Light touch    Active Range of Motion   Left Shoulder   Flexion: 146 degrees   Extension: 84 degrees   Abduction: 169 degrees   External rotation 0°: 68 degrees   Internal rotation BTB: T7     Additional Active Range of Motion Details  RUE not tested at this time    Strength/Myotome Testing     Left Shoulder     Planes of Motion   Flexion: 5   Extension: 5   Abduction: 4   External rotation at 0°: 5   Internal rotation at 0°: 5     Isolated Muscles   Biceps: 5   Triceps: 5     Additional Strength Details  RUE not tested at this time          Assessment & Plan       Assessment  Impairments: abnormal muscle firing, abnormal muscle tone, abnormal or restricted ROM, activity intolerance, impaired physical strength, lacks appropriate home exercise program and pain with function   Functional limitations: carrying objects, lifting, sleeping, pulling, pushing, uncomfortable because of pain, moving in bed, reaching behind back, reaching overhead and unable to perform repetitive tasks   Assessment details: Patient is a 50 y.o. year old male who presents to therapy with s/p R RCT repair on 5/30/25. He presents with significant impairments including decreased RUE ROM, decreased RUE strength, impaired activity tolerance, impaired QuickDASH score, and pain. Impairments affect ADL/IADL's, functional activities, recreational activities, and community activities. Pt will benefit from skilled physical therapy to address impairments, decrease pain and restore function. After a chart review and following doctors orders, pt was assisted with bandage removal today.There were no signs of infection at the surgical sites.  Prognosis: good    Goals  Plan Goals: Pt would benefit from skilled care for the listed deficits of the R shoulder.    SHORT TERM GOALS: Time for Goal Achievement: 4 weeks  1.  Patient to be compliant with and understand progression of HEP.   2.  Increase cervical, thoracic and (R) GH  mobility to allow for improved mobility of shoulder with less pain  3.  Increased (R) UE strength to 4+/5 to allow for household activities, including lifting.  4.  Pt to exhibit (R) shoulder active assisted flexion/° AAROM to assist with reaching overhead without pain.    LONG TERM GOALS: Time for Goal Achievement: 4 months  1.  Pt score < 25% perceived disability on Quick DASH.  2.  Pt. to exhibit (R) shoulder AROM to WFL to allow for reaching overhead and out (ABD) without pain limiting function.  3.  Pt to exhibit 5/5 UE strength to allow for pushing /pulling and lifting to occur with pain < 3/10  4.  Pt able to reach overhead and lift 10# to allow for return to doing home/yard/recreational activities with min to no pain.    Plan  Therapy options: will be seen for skilled therapy services  Planned modality interventions: cryotherapy, electrical stimulation/Russian stimulation, high voltage pulsed current (pain management), TENS and thermotherapy (hydrocollator packs)  Planned therapy interventions: manual therapy, motor coordination training, neuromuscular re-education, postural training, soft tissue mobilization, spinal/joint mobilization, strengthening, stretching, therapeutic activities, joint mobilization, IADL retraining, home exercise program, functional ROM exercises, flexibility, fine motor coordination training, abdominal trunk stabilization and ADL retraining  Frequency: 2x week  Duration in weeks: 10  Treatment plan discussed with: patient      History # of Personal Factors and/or Comorbidities: MODERATE (1-2)  Examination of Body System(s): # of elements: LOW (1-2)  Clinical Presentation: STABLE   Clinical Decision Making: LOW     Timed:         Manual Therapy:    10     mins  89721;     Therapeutic Exercise:    10     mins  37340;     Neuromuscular Chai:        mins  25271;    Therapeutic Activity:          mins  60470;     Gait Training:           mins  25872;     Ultrasound:          mins   26263;    Ionto                                   mins   68117  Self Care                       10     mins   51462        Un-Timed:  Electrical Stimulation:         mins  67259 ( );  Dry Needling          mins self-pay  Traction          mins 13598  Low Eval     20     Mins  11778  Mod Eval          Mins  82351  High Eval                            Mins  67573  Re-Eval                               mins  23870        Timed Treatment:   30   mins   Total Treatment:     50   mins    Rebeca Asher PT  PT SIGNATURE: Veronica Cerrato PT, DPT          DATE TREATMENT INITIATED: 6/16/2025    Initial Certification  Certification Period: 9/14/2025  I certify that the therapy services are furnished while this patient is under my care.  The services outlined above are required by this patient, and will be reviewed every 90 days.     PHYSICIAN: Gregory Dickey MD      DATE:     Please sign and return via fax to [phone].. Thank you, [facility].

## 2025-06-23 ENCOUNTER — TREATMENT (OUTPATIENT)
Dept: PHYSICAL THERAPY | Facility: CLINIC | Age: 50
End: 2025-06-23
Payer: COMMERCIAL

## 2025-06-23 ENCOUNTER — PATIENT MESSAGE (OUTPATIENT)
Dept: ORTHOPEDIC SURGERY | Facility: CLINIC | Age: 50
End: 2025-06-23
Payer: COMMERCIAL

## 2025-06-23 DIAGNOSIS — M25.511 ACUTE PAIN OF RIGHT SHOULDER: ICD-10-CM

## 2025-06-23 DIAGNOSIS — Z47.89 ORTHOPEDIC AFTERCARE: ICD-10-CM

## 2025-06-23 DIAGNOSIS — M75.121 COMPLETE TEAR OF RIGHT ROTATOR CUFF, UNSPECIFIED WHETHER TRAUMATIC: ICD-10-CM

## 2025-06-23 DIAGNOSIS — Z47.89 ORTHOPEDIC AFTERCARE: Primary | ICD-10-CM

## 2025-06-23 DIAGNOSIS — M75.101 NONTRAUMATIC TEAR OF RIGHT ROTATOR CUFF, UNSPECIFIED TEAR EXTENT: Primary | ICD-10-CM

## 2025-06-23 PROCEDURE — 97140 MANUAL THERAPY 1/> REGIONS: CPT | Performed by: PHYSICAL THERAPIST

## 2025-06-23 PROCEDURE — 97110 THERAPEUTIC EXERCISES: CPT | Performed by: PHYSICAL THERAPIST

## 2025-06-23 PROCEDURE — 97112 NEUROMUSCULAR REEDUCATION: CPT | Performed by: PHYSICAL THERAPIST

## 2025-06-23 NOTE — PROGRESS NOTES
Physical Therapy Daily Treatment Note    [address] [place] [address]    VISIT#: 2    Subjective   Ji Cooney reports that he is doing well and has no new complaints as of today.  Pain Rating (0/10): 5    Objective     See Exercise, Manual, and Modality Logs for complete treatment.     Patient Education: Progress of therapy and POC    Assessment/Plan  Pt tolerated today's treatment interventions that progressed RUE PROM and strength within protocol limitations. CP was applied at the end of the session to minimize pain levels.    Plan  Progress per Plan of Care and Progress strengthening /stabilization /functional activity            Timed:         Manual Therapy:    10     mins  02498;     Therapeutic Exercise:    10     mins  33317;     Neuromuscular Chai:    10    mins  51307;    Therapeutic Activity:          mins  15068;     Gait Training:           mins  68831;     Ultrasound:          mins  23036;    Ionto                                   mins   67202  Self Care                            mins   48797    Un-Timed:  Electrical Stimulation:         mins  06462 ( );  Dry Needling          mins self-pay  Traction          mins 52503  Low Eval          Mins  21930  Mod Eval          Mins  06865  High Eval                            Mins  72785  Re-Eval                               mins  20599    Timed Treatment:   30   mins   Total Treatment:     30   mins    Rebeca Rey Student PT      Veronica Cerrato PT, DPT

## 2025-06-24 RX ORDER — OXYCODONE AND ACETAMINOPHEN 5; 325 MG/1; MG/1
1 TABLET ORAL EVERY 6 HOURS PRN
Qty: 28 TABLET | Refills: 0 | Status: SHIPPED | OUTPATIENT
Start: 2025-06-24

## 2025-06-25 RX ORDER — SERTRALINE HYDROCHLORIDE 100 MG/1
150 TABLET, FILM COATED ORAL DAILY
Qty: 90 TABLET | Refills: 1 | Status: SHIPPED | OUTPATIENT
Start: 2025-06-25

## 2025-06-30 ENCOUNTER — OFFICE VISIT (OUTPATIENT)
Dept: ORTHOPEDIC SURGERY | Facility: CLINIC | Age: 50
End: 2025-06-30
Payer: COMMERCIAL

## 2025-06-30 ENCOUNTER — TREATMENT (OUTPATIENT)
Dept: PHYSICAL THERAPY | Facility: CLINIC | Age: 50
End: 2025-06-30
Payer: COMMERCIAL

## 2025-06-30 VITALS — HEART RATE: 70 BPM | WEIGHT: 194 LBS | OXYGEN SATURATION: 97 % | HEIGHT: 70 IN | BODY MASS INDEX: 27.77 KG/M2

## 2025-06-30 DIAGNOSIS — Z47.89 ORTHOPEDIC AFTERCARE: ICD-10-CM

## 2025-06-30 DIAGNOSIS — Z47.89 ORTHOPEDIC AFTERCARE: Primary | ICD-10-CM

## 2025-06-30 DIAGNOSIS — M75.121 COMPLETE TEAR OF RIGHT ROTATOR CUFF, UNSPECIFIED WHETHER TRAUMATIC: ICD-10-CM

## 2025-06-30 DIAGNOSIS — M75.101 NONTRAUMATIC TEAR OF RIGHT ROTATOR CUFF, UNSPECIFIED TEAR EXTENT: Primary | ICD-10-CM

## 2025-06-30 DIAGNOSIS — M25.511 ACUTE PAIN OF RIGHT SHOULDER: ICD-10-CM

## 2025-06-30 PROCEDURE — 99024 POSTOP FOLLOW-UP VISIT: CPT | Performed by: PHYSICIAN ASSISTANT

## 2025-06-30 RX ORDER — OXYCODONE AND ACETAMINOPHEN 5; 325 MG/1; MG/1
1 TABLET ORAL EVERY 12 HOURS PRN
Qty: 28 TABLET | Refills: 0 | Status: SHIPPED | OUTPATIENT
Start: 2025-06-30

## 2025-06-30 NOTE — PROGRESS NOTES
" Patient ID: Ji Cooney is a 50 y.o. male.  History of Present Illness  The patient presents for a follow-up of right shoulder arthroscopy with supraspinatus repair performed on 05/30/2025.    He reports experiencing pain in his right shoulder, which intensifies during physical therapy sessions.  He has been diligent in wearing this sling, particularly at night, although there have been instances where discomfort necessitated its removal. However, he ensures minimal arm movement during these periods.  There is physical therapy progress was commendable last week, but today's session was challenging as it involved overhead arm movements.  He is scheduled to be out of town for work from Monday to Friday and is concerned about missing a week of therapy.  He has been performing the prescribed exercises at home and has not yet started any strengthening exercises.  Patient also reports no elbow exercises but admits to slight elbow movement when out of the sling.  He has been applying ice to his shoulder and has been cautious about not making quick movements or lifting heavy objects.  He has not been prescribed naproxen but is currently on oxycodone for pain management.    She has diabetes, which is well-controlled.    Objective:  Pulse 70   Ht 177.8 cm (70\")   Wt 88 kg (194 lb)   SpO2 97%   BMI 27.84 kg/m²     Physical Examination:  Physical Exam  Musculoskeletal:  Right upper extremity: Radial pulse is palpable. Capillary refill less than 1 second to all the digits. Range of motion of the right digits and the right wrist are grossly intact. Range of motion at the elbow is grossly intact. No numbness or tingling in the hand. Sensation to light touch intact in both upper extremities and hands. Visual inspection of the right shoulder demonstrates intact skin, well-healed incisions, and no signs of infection. Trace atrophy may be present. Tenderness over the anterior joint line. Passive forward flexion 130 with trace " stiffness, passive abduction 85.    Imaging:  None new    Assessment:    Diagnoses and all orders for this visit:    1. Orthopedic aftercare (Primary)    Plan:  Assessment & Plan  1. Post-operative status following right shoulder arthroscopy with supraspinatus repair:  Satisfactory progress in terms of range of motion, strength, and stability. Pain is present but manageable, exacerbated by therapy. No signs of infection, and incisions are well healed. Trace atrophy noted with tenderness over the anterior joint line. Passive forward flexion is 130 degrees with trace stiffness, and passive abduction is 85 degrees.    Continue exercises independently for 10 minutes in the morning and 10 minutes at night during the absence from therapy. Discontinue the use of the sling completely by 07/11/2025. Avoid sudden movements to prevent strain on the repair. The sling can be used at night if necessary after the 6-week shyla, though most patients prefer not to. Monitor for pulling pain, which may persist up to 6 months, and report any significant discomfort. No quick movements out to the side. Gradual strengthening will be introduced with low weights (1-2 pounds) as guided by the therapist. Continue icing as needed for pain management. Oxycodone prescribed for pain relief.    Patient or patient representative verbalized consent for the use of Ambient Listening during the visit with  Sreekanth Moreira PA-C for chart documentation. 6/30/2025  13:02 EDT  Procedures  Answers submitted by the patient for this visit:  Post Operative Visit (Submitted on 6/30/2025)  Chief Complaint: Follow-up  Pain Control: controlled  Fever: no fever  Diet: adequate intake  Activity: moderately limited  Operative Site Issues: No  Additional information: It’s painful but nothing crazy

## 2025-06-30 NOTE — PROGRESS NOTES
Physical Therapy Daily Treatment Note    [address] [place] [address]    VISIT#: 3    Subjective   Ji Cooney reports that he is doing well, and that he did okay following the previous session. He states that today he is moderately sore in the front of the shoulder.  Pain Rating (0/10): 4    Objective     See Exercise, Manual, and Modality Logs for complete treatment.     Patient Education: Progress of therapy and POC    Assessment/Plan  Pt demonstrated high irritability levels following today's treatment interventions, E-STIM with a CP was introduced to minimize the pain. Pt continues to progress ROM, strength, and stability within protocol limitations.    Plan  Progress per Plan of Care and Progress strengthening /stabilization /functional activity            Timed:         Manual Therapy:    10     mins  76438;     Therapeutic Exercise:    10     mins  40022;     Neuromuscular Chai:    10    mins  60282;    Therapeutic Activity:          mins  52351;     Gait Training:           mins  14400;     Ultrasound:          mins  74838;    Ionto                                   mins   62637  Self Care                            mins   71501    Un-Timed:  Electrical Stimulation:    15     mins  91302 ( );  Dry Needling          mins self-pay  Traction          mins 48393  Low Eval          Mins  46440  Mod Eval          Mins  86156  High Eval                            Mins  96712  Re-Eval                               mins  88651    Timed Treatment:   30   mins   Total Treatment:     45   mins    Rebeca Rey Student PT    Veronica Cerrato PT, DPT  Physical Therapist

## 2025-07-07 ENCOUNTER — TREATMENT (OUTPATIENT)
Dept: PHYSICAL THERAPY | Facility: CLINIC | Age: 50
End: 2025-07-07
Payer: COMMERCIAL

## 2025-07-07 DIAGNOSIS — Z47.89 ORTHOPEDIC AFTERCARE: ICD-10-CM

## 2025-07-07 DIAGNOSIS — M75.101 NONTRAUMATIC TEAR OF RIGHT ROTATOR CUFF, UNSPECIFIED TEAR EXTENT: Primary | ICD-10-CM

## 2025-07-07 DIAGNOSIS — M75.121 COMPLETE TEAR OF RIGHT ROTATOR CUFF, UNSPECIFIED WHETHER TRAUMATIC: ICD-10-CM

## 2025-07-07 DIAGNOSIS — M25.511 ACUTE PAIN OF RIGHT SHOULDER: ICD-10-CM

## 2025-07-07 PROCEDURE — 97140 MANUAL THERAPY 1/> REGIONS: CPT | Performed by: PHYSICAL THERAPIST

## 2025-07-07 PROCEDURE — 97014 ELECTRIC STIMULATION THERAPY: CPT | Performed by: PHYSICAL THERAPIST

## 2025-07-07 PROCEDURE — 97110 THERAPEUTIC EXERCISES: CPT | Performed by: PHYSICAL THERAPIST

## 2025-07-07 PROCEDURE — 97112 NEUROMUSCULAR REEDUCATION: CPT | Performed by: PHYSICAL THERAPIST

## 2025-07-07 NOTE — PROGRESS NOTES
Physical Therapy Daily Treatment Note    [address] [place] [address]    VISIT#: 4    Subjective   Ji Cooney reports that he saw his doctor last week and was cleared to stop wearing his sling on 7/11. Pt states that he will be out of town next week due to a work trip. He reports that his forearm is a little sore and achy today, but has no other complaints at this time.  Pain Rating (0/10): 3    Objective     See Exercise, Manual, and Modality Logs for complete treatment.     Patient Education: Progress of therapy and POC    Assessment/Plan  Pt demonstrated moderate irritability levels following today's treatment interventions, E-STIM with a CP was applied to minimize the pain. Pt continues to progress ROM, strength, and stability within protocol limitations. Pt was made aware of the expected increase in number of sessions per week once he gets back from his vacation, as we will be past the 6 week shyla and able to progress more.       Plan  Progress per Plan of Care and Progress strengthening /stabilization /functional activity            Timed:         Manual Therapy:    10     mins  22040;     Therapeutic Exercise:    10     mins  65678;     Neuromuscular Chai:    10    mins  83340;    Therapeutic Activity:          mins  19508;     Gait Training:           mins  04979;     Ultrasound:          mins  40822;    Ionto                                   mins   37542  Self Care                            mins   24605    Un-Timed:  Electrical Stimulation:    15     mins  24983 ( );  Dry Needling          mins self-pay  Traction          mins 73015  Low Eval          Mins  48152  Mod Eval          Mins  44354  High Eval                            Mins  34205  Re-Eval                               mins  58403    Timed Treatment:   30   mins   Total Treatment:     45   mins    Rebeca Rey Student PT    Veronica Cerrato PT, DPT  Physical Therapist

## 2025-07-21 ENCOUNTER — TREATMENT (OUTPATIENT)
Dept: PHYSICAL THERAPY | Facility: CLINIC | Age: 50
End: 2025-07-21
Payer: COMMERCIAL

## 2025-07-21 DIAGNOSIS — M25.511 ACUTE PAIN OF RIGHT SHOULDER: ICD-10-CM

## 2025-07-21 DIAGNOSIS — Z47.89 ORTHOPEDIC AFTERCARE: ICD-10-CM

## 2025-07-21 DIAGNOSIS — M75.101 NONTRAUMATIC TEAR OF RIGHT ROTATOR CUFF, UNSPECIFIED TEAR EXTENT: Primary | ICD-10-CM

## 2025-07-21 DIAGNOSIS — M75.121 COMPLETE TEAR OF RIGHT ROTATOR CUFF, UNSPECIFIED WHETHER TRAUMATIC: ICD-10-CM

## 2025-07-21 PROCEDURE — 97110 THERAPEUTIC EXERCISES: CPT | Performed by: PHYSICAL THERAPIST

## 2025-07-21 PROCEDURE — 97140 MANUAL THERAPY 1/> REGIONS: CPT | Performed by: PHYSICAL THERAPIST

## 2025-07-21 PROCEDURE — 97164 PT RE-EVAL EST PLAN CARE: CPT | Performed by: PHYSICAL THERAPIST

## 2025-07-21 NOTE — PROGRESS NOTES
Re-Assessment / Progress Note    Patient: Ji Cooney   : 1975  Diagnosis/ICD-10 Code:  Nontraumatic tear of right rotator cuff, unspecified tear extent [M75.101]  Referring practitioner: Gregory Dickey, *  Date of Initial Visit: Episode Type: THERAPY  Noted: 2025    Today's Date: 2025  Patient seen for 5 sessions.      Subjective:   Ji Cooney reports: that since being able to take his brace off he has been finding himself using his arm more on accident resulting in an increase in pain levels. He states that besides the constant discomfort he is doing well.   Pain levels (0-10): Worst: 7, Best: 1, Current: 6  Subjective Questionnaire: QuickDASH: ADL: 45.45%, Work: 37.5%, Sport: 81.25%  Clinical Progress: improved  Home Program Compliance: Yes  Treatment has included: therapeutic exercise, neuromuscular re-education, manual therapy, therapeutic activity, electrical stimulation, and cryotherapy    Subjective   Objective     Active Assisted Range of Motion   Right Shoulder   Flexion: 126 degrees - supine  Extension: 58 degrees - standing  Abduction: 105 degrees - standing  External rotation 0°: 47 degrees - standing    Additional Active Range of Motion Details  AAROM using 1lb bar    Strength/Myotome Testing     Additional Strength Details  RUE not tested at this time due to protocol limitations      Assessment/Plan    Assessment  Assessment details: Patient is a 50 y.o. year old male who presents to therapy with s/p R RCT repair on 25. At initial evaluation he presented with significant impairments including decreased RUE ROM, decreased RUE strength, impaired activity tolerance, impaired QuickDASH score, and pain. Pt has demonstrated improvements in RUE PROM, but continues to be limited by protocol limitations. Impairments affect ADL/IADL's, functional activities, recreational activities, and community activities. Pt will benefit from skilled physical therapy to address impairments,  decrease pain and restore function.    Progress toward previous goals: Partially Met    Goals    SHORT TERM GOALS: Time for Goal Achievement: 4 weeks  1.  Patient to be compliant with and understand progression of HEP. - MET 7/21/25  2.  Increase cervical, thoracic and (R) GH mobility to allow for improved mobility of shoulder with less pain - MET 7/21/25  3.  Increased (R) UE strength to 4+/5 to allow for household activities, including lifting. - NOT MET 7/21/25  4.  Pt to exhibit (R) shoulder active assisted flexion/° AAROM to assist with reaching overhead without pain. - NOT MET 7/21/25    LONG TERM GOALS: Time for Goal Achievement: 4 months  1.  Pt score < 25% perceived disability on Quick DASH. - NOT MET 7/21/25  2.  Pt. to exhibit (R) shoulder AROM to WFL to allow for reaching overhead and out (ABD) without pain limiting function. - NOT MET 7/21/25  3.  Pt to exhibit 5/5 UE strength to allow for pushing /pulling and lifting to occur with pain < 3/10 - NOT MET 7/21/25  4.  Pt able to reach overhead and lift 10# to allow for return to doing home/yard/recreational activities with min to no pain. - NOT MET 7/21/25    Recommendations: Continue as planned  Timeframe: 3 months, 2x a week  Prognosis to achieve goals: margaret Rey Student PT  PT Signature: Veronica Cerrato PT, DPT            [place] Lic. # [id number]        Based upon review of the patient's progress and continued therapy plan, it is my medical opinion that Ji Cooney should continue physical therapy treatment at [facility]  [address]  [place] [address] [phone].    Signature: __________________________________  Gregory Dickey MD    Timed:         Manual Therapy:    10     mins  65082;     Therapeutic Exercise:    10     mins  56363;     Neuromuscular Chai:        mins  71132;    Therapeutic Activity:          mins  24421;     Gait Training:           mins  79247;      Ultrasound:          mins  68194;    Ionto                                   mins   51369  Self Care                            mins   35343        Un-Timed:  Electrical Stimulation:         mins  70342 ( );  Dry Needling          mins self-pay  Traction          mins 87178  Low Eval          Mins  64672  Mod Eval          Mins  44629  High Eval                            Mins  69499  Re-Eval                           10    mins  51224        Timed Treatment:   20   mins   Total Treatment:     30   mins

## 2025-07-24 ENCOUNTER — TREATMENT (OUTPATIENT)
Dept: PHYSICAL THERAPY | Facility: CLINIC | Age: 50
End: 2025-07-24
Payer: COMMERCIAL

## 2025-07-24 DIAGNOSIS — M75.121 COMPLETE TEAR OF RIGHT ROTATOR CUFF, UNSPECIFIED WHETHER TRAUMATIC: ICD-10-CM

## 2025-07-24 DIAGNOSIS — M25.511 ACUTE PAIN OF RIGHT SHOULDER: ICD-10-CM

## 2025-07-24 DIAGNOSIS — Z47.89 ORTHOPEDIC AFTERCARE: ICD-10-CM

## 2025-07-24 DIAGNOSIS — M75.101 NONTRAUMATIC TEAR OF RIGHT ROTATOR CUFF, UNSPECIFIED TEAR EXTENT: Primary | ICD-10-CM

## 2025-07-24 NOTE — PROGRESS NOTES
Physical Therapy Daily Treatment Note    [address] [place] [address]    VISIT#: 6    Subjective   Ji Cooney reports that he continues to have mild discomfort but that he is doing well overall.  Pain Rating (0/10): 1    Objective     See Exercise, Manual, and Modality Logs for complete treatment.     Patient Education: Progress of therapy and POC    Assessment/Plan  Pt continues to progress ROM, strength, stability, and activity tolerance with moderate difficulty and an increase in pain as the session progressed. ESTIM and CP were applied at the session to minimize pain levels.    Plan  Progress per Plan of Care and Progress strengthening /stabilization /functional activity            Timed:         Manual Therapy:    10     mins  68269;     Therapeutic Exercise:    13     mins  83715;     Neuromuscular Chai:    12    mins  27868;    Therapeutic Activity:          mins  29918;     Gait Training:           mins  08198;     Ultrasound:          mins  24295;    Ionto                                   mins   17171  Self Care                            mins   94690    Un-Timed:  Electrical Stimulation:    15     mins  03489 ( );  Dry Needling          mins self-pay  Traction         mins 35098  Low Eval          Mins  59631  Mod Eval          Mins  42440  High Eval                            Mins  88982  Re-Eval                               mins  61624    Timed Treatment:   35   mins   Total Treatment:     50   mins    Rebeca Rey Student PT    Veronica Cerrato PT, DPT  Physical Therapist

## 2025-07-28 ENCOUNTER — TREATMENT (OUTPATIENT)
Dept: PHYSICAL THERAPY | Facility: CLINIC | Age: 50
End: 2025-07-28
Payer: COMMERCIAL

## 2025-07-28 DIAGNOSIS — M25.511 ACUTE PAIN OF RIGHT SHOULDER: ICD-10-CM

## 2025-07-28 DIAGNOSIS — Z47.89 ORTHOPEDIC AFTERCARE: ICD-10-CM

## 2025-07-28 DIAGNOSIS — M75.121 COMPLETE TEAR OF RIGHT ROTATOR CUFF, UNSPECIFIED WHETHER TRAUMATIC: ICD-10-CM

## 2025-07-28 DIAGNOSIS — M75.101 NONTRAUMATIC TEAR OF RIGHT ROTATOR CUFF, UNSPECIFIED TEAR EXTENT: Primary | ICD-10-CM

## 2025-07-28 PROCEDURE — 97110 THERAPEUTIC EXERCISES: CPT | Performed by: PHYSICAL THERAPIST

## 2025-07-28 PROCEDURE — 97112 NEUROMUSCULAR REEDUCATION: CPT | Performed by: PHYSICAL THERAPIST

## 2025-07-28 PROCEDURE — 97140 MANUAL THERAPY 1/> REGIONS: CPT | Performed by: PHYSICAL THERAPIST

## 2025-07-28 PROCEDURE — 97014 ELECTRIC STIMULATION THERAPY: CPT | Performed by: PHYSICAL THERAPIST

## 2025-07-28 NOTE — PROGRESS NOTES
Physical Therapy Daily Treatment Note    [address] [place] [address]    VISIT#: 7    Subjective   Ji Cooney reports having minimal soreness the rest of the day after the last treatment session.   Pain Rating (0/10): 3    Objective     See Exercise, Manual, and Modality Logs for complete treatment.     Patient Education: Progress of therapy and POC    Assessment/Plan  Pt continues to progress RUE ROM, strength, stability, and activity tolerance within protocol limitations with moderate difficulty and a slight increase in pain.    Plan  Progress per Plan of Care and Progress strengthening /stabilization /functional activity            Timed:         Manual Therapy:    10     mins  86198;     Therapeutic Exercise:    10     mins  40978;     Neuromuscular Chai:    12    mins  81490;    Therapeutic Activity:          mins  60381;     Gait Training:           mins  65249;     Ultrasound:          mins  46978;    Ionto                                   mins   25223  Self Care                            mins   50329    Un-Timed:  Electrical Stimulation:    15     mins  55939 ( );  Dry Needling          mins self-pay  Traction          mins 88151  Low Eval          Mins  29486  Mod Eval          Mins  55966  High Eval                            Mins  35842  Re-Eval                               mins  92174    Timed Treatment:   32   mins   Total Treatment:     47   mins    Rebeca Rey Student PT    Veronica Cerrato PT, DPT  Physical Therapist

## 2025-07-31 ENCOUNTER — TREATMENT (OUTPATIENT)
Dept: PHYSICAL THERAPY | Facility: CLINIC | Age: 50
End: 2025-07-31
Payer: COMMERCIAL

## 2025-07-31 DIAGNOSIS — M25.511 ACUTE PAIN OF RIGHT SHOULDER: ICD-10-CM

## 2025-07-31 DIAGNOSIS — M75.101 NONTRAUMATIC TEAR OF RIGHT ROTATOR CUFF, UNSPECIFIED TEAR EXTENT: Primary | ICD-10-CM

## 2025-07-31 DIAGNOSIS — Z47.89 ORTHOPEDIC AFTERCARE: ICD-10-CM

## 2025-07-31 DIAGNOSIS — M75.121 COMPLETE TEAR OF RIGHT ROTATOR CUFF, UNSPECIFIED WHETHER TRAUMATIC: ICD-10-CM

## 2025-07-31 NOTE — PROGRESS NOTES
Physical Therapy Daily Treatment Note    [address] [place] [address]    VISIT#: 8    Subjective   Ji Cooney reports that he is doing well today and has no new complaints at this time.  Pain Rating (0/10): 3    Objective     See Exercise, Manual, and Modality Logs for complete treatment.     Patient Education: Progress of therapy and POC    Assessment/Plan  Pt continues to progress R UE ROM, strength, stability, and activity tolerance within protocol limitations with mild to moderate fatigue and difficulty. CP and ESTIM was applied at the end of session to minimize pain levels.    Plan  Progress per Plan of Care and Progress strengthening /stabilization /functional activity            Timed:         Manual Therapy:    10     mins  23583;     Therapeutic Exercise:    13     mins  10646;     Neuromuscular Chai:    12    mins  37072;    Therapeutic Activity:          mins  14378;     Gait Training:           mins  42630;     Ultrasound:          mins  47088;    Ionto                                   mins   73888  Self Care                            mins   63379    Un-Timed:  Electrical Stimulation:    15     mins  15663 ( );  Dry Needling          mins self-pay  Traction          mins 62668  Low Eval          Mins  24529  Mod Eval          Mins  52537  High Eval                            Mins  67839  Re-Eval                               mins  25950    Timed Treatment:   35   mins   Total Treatment:     50   mins    Rebeca Rey Student PT    Veronica Cerrato PT, DPT  Physical Therapist

## 2025-08-06 ENCOUNTER — TREATMENT (OUTPATIENT)
Dept: PHYSICAL THERAPY | Facility: CLINIC | Age: 50
End: 2025-08-06
Payer: COMMERCIAL

## 2025-08-06 DIAGNOSIS — M75.121 COMPLETE TEAR OF RIGHT ROTATOR CUFF, UNSPECIFIED WHETHER TRAUMATIC: ICD-10-CM

## 2025-08-06 DIAGNOSIS — M75.101 NONTRAUMATIC TEAR OF RIGHT ROTATOR CUFF, UNSPECIFIED TEAR EXTENT: Primary | ICD-10-CM

## 2025-08-06 DIAGNOSIS — M25.511 ACUTE PAIN OF RIGHT SHOULDER: ICD-10-CM

## 2025-08-06 DIAGNOSIS — Z47.89 ORTHOPEDIC AFTERCARE: ICD-10-CM

## 2025-08-08 ENCOUNTER — TREATMENT (OUTPATIENT)
Dept: PHYSICAL THERAPY | Facility: CLINIC | Age: 50
End: 2025-08-08
Payer: COMMERCIAL

## 2025-08-08 DIAGNOSIS — Z47.89 ORTHOPEDIC AFTERCARE: ICD-10-CM

## 2025-08-08 DIAGNOSIS — M75.101 NONTRAUMATIC TEAR OF RIGHT ROTATOR CUFF, UNSPECIFIED TEAR EXTENT: Primary | ICD-10-CM

## 2025-08-08 DIAGNOSIS — M75.121 COMPLETE TEAR OF RIGHT ROTATOR CUFF, UNSPECIFIED WHETHER TRAUMATIC: ICD-10-CM

## 2025-08-08 DIAGNOSIS — M25.511 ACUTE PAIN OF RIGHT SHOULDER: ICD-10-CM

## 2025-08-12 ENCOUNTER — TREATMENT (OUTPATIENT)
Dept: PHYSICAL THERAPY | Facility: CLINIC | Age: 50
End: 2025-08-12
Payer: COMMERCIAL

## 2025-08-12 DIAGNOSIS — M75.101 NONTRAUMATIC TEAR OF RIGHT ROTATOR CUFF, UNSPECIFIED TEAR EXTENT: Primary | ICD-10-CM

## 2025-08-12 DIAGNOSIS — Z47.89 ORTHOPEDIC AFTERCARE: ICD-10-CM

## 2025-08-12 DIAGNOSIS — M25.511 ACUTE PAIN OF RIGHT SHOULDER: ICD-10-CM

## 2025-08-12 DIAGNOSIS — M75.121 COMPLETE TEAR OF RIGHT ROTATOR CUFF, UNSPECIFIED WHETHER TRAUMATIC: ICD-10-CM

## 2025-08-19 ENCOUNTER — TREATMENT (OUTPATIENT)
Dept: PHYSICAL THERAPY | Facility: CLINIC | Age: 50
End: 2025-08-19
Payer: COMMERCIAL

## 2025-08-19 DIAGNOSIS — M25.511 ACUTE PAIN OF RIGHT SHOULDER: ICD-10-CM

## 2025-08-19 DIAGNOSIS — Z47.89 ORTHOPEDIC AFTERCARE: ICD-10-CM

## 2025-08-19 DIAGNOSIS — F41.1 GAD (GENERALIZED ANXIETY DISORDER): ICD-10-CM

## 2025-08-19 DIAGNOSIS — M75.121 COMPLETE TEAR OF RIGHT ROTATOR CUFF, UNSPECIFIED WHETHER TRAUMATIC: ICD-10-CM

## 2025-08-19 DIAGNOSIS — M75.101 NONTRAUMATIC TEAR OF RIGHT ROTATOR CUFF, UNSPECIFIED TEAR EXTENT: Primary | ICD-10-CM

## 2025-08-19 PROCEDURE — 97164 PT RE-EVAL EST PLAN CARE: CPT | Performed by: PHYSICAL THERAPIST

## 2025-08-19 PROCEDURE — 97112 NEUROMUSCULAR REEDUCATION: CPT | Performed by: PHYSICAL THERAPIST

## 2025-08-19 PROCEDURE — 97110 THERAPEUTIC EXERCISES: CPT | Performed by: PHYSICAL THERAPIST

## 2025-08-19 PROCEDURE — 97140 MANUAL THERAPY 1/> REGIONS: CPT | Performed by: PHYSICAL THERAPIST

## 2025-08-19 RX ORDER — ALPRAZOLAM 0.25 MG
0.25 TABLET ORAL DAILY PRN
Qty: 30 TABLET | Refills: 0 | Status: SHIPPED | OUTPATIENT
Start: 2025-08-19

## 2025-08-21 ENCOUNTER — TREATMENT (OUTPATIENT)
Dept: PHYSICAL THERAPY | Facility: CLINIC | Age: 50
End: 2025-08-21
Payer: COMMERCIAL

## 2025-08-21 DIAGNOSIS — M75.121 COMPLETE TEAR OF RIGHT ROTATOR CUFF, UNSPECIFIED WHETHER TRAUMATIC: ICD-10-CM

## 2025-08-21 DIAGNOSIS — M25.511 ACUTE PAIN OF RIGHT SHOULDER: ICD-10-CM

## 2025-08-21 DIAGNOSIS — M75.101 NONTRAUMATIC TEAR OF RIGHT ROTATOR CUFF, UNSPECIFIED TEAR EXTENT: Primary | ICD-10-CM

## 2025-08-21 DIAGNOSIS — E78.2 MIXED HYPERLIPIDEMIA: ICD-10-CM

## 2025-08-21 DIAGNOSIS — Z47.89 ORTHOPEDIC AFTERCARE: ICD-10-CM

## 2025-08-22 RX ORDER — ATORVASTATIN CALCIUM 40 MG/1
40 TABLET, FILM COATED ORAL NIGHTLY
Qty: 90 TABLET | Refills: 3 | Status: SHIPPED | OUTPATIENT
Start: 2025-08-22

## 2025-08-27 ENCOUNTER — TREATMENT (OUTPATIENT)
Dept: PHYSICAL THERAPY | Facility: CLINIC | Age: 50
End: 2025-08-27
Payer: COMMERCIAL

## 2025-08-27 DIAGNOSIS — M25.511 ACUTE PAIN OF RIGHT SHOULDER: ICD-10-CM

## 2025-08-27 DIAGNOSIS — M75.101 NONTRAUMATIC TEAR OF RIGHT ROTATOR CUFF, UNSPECIFIED TEAR EXTENT: Primary | ICD-10-CM

## 2025-08-27 DIAGNOSIS — Z47.89 ORTHOPEDIC AFTERCARE: ICD-10-CM

## 2025-08-27 DIAGNOSIS — M75.121 COMPLETE TEAR OF RIGHT ROTATOR CUFF, UNSPECIFIED WHETHER TRAUMATIC: ICD-10-CM

## (undated) DEVICE — TBG ARTHSCP DUALWAVE

## (undated) DEVICE — TBG ARTHSCP PT W CONN/REDUC 8FT

## (undated) DEVICE — ABL ASP APOLLORF I90 90DEG

## (undated) DEVICE — SYR LL TP 10ML STRL

## (undated) DEVICE — SUTURELASSO CRV 25D LT

## (undated) DEVICE — SPNG GZ AVANT 6PLY 4X4IN STRL PK/2

## (undated) DEVICE — SUTURELASSO CRV 25D RT

## (undated) DEVICE — THE STERILE LIGHT HANDLE COVER IS USED WITH STERIS SURGICAL LIGHTING AND VISUALIZATION SYSTEMS.

## (undated) DEVICE — KT SURG TURNOVER 050

## (undated) DEVICE — TBG ARTHSCP PUMP W CONN/REDUC 8FT

## (undated) DEVICE — THE STERILE CAMERA HANDLE COVER IS FOR USE WITH THE STERIS SURGICAL LIGHTING AND VISUALIZATION SYSTEMS.

## (undated) DEVICE — UNDRGLV SURG BIOGEL PIMICROINDICATOR SYNTH SZ8 LF STRL

## (undated) DEVICE — SOL IRR H2O BO 1000ML STRL

## (undated) DEVICE — TUBING, SUCTION, 1/4" X 12', STRAIGHT: Brand: MEDLINE

## (undated) DEVICE — SYR LUERLOK 30CC

## (undated) DEVICE — SUT 1/0 27 PDS II VIO MONO CT Z353H

## (undated) DEVICE — BLD SHAVER EXCALIBUR CRV 4MM

## (undated) DEVICE — CANN PASSPORT BUTN 8MM 4CM

## (undated) DEVICE — UNDERGLV SURG BIOGEL/PI PF SYNTH SURG SZ8.5 BLU 50/BX

## (undated) DEVICE — GLOVE,SURG,SENSICARE SLT,LF,PF,8.5: Brand: MEDLINE

## (undated) DEVICE — GOWN,SLEEVE,STERILE,W/CSR WRAP,1/P: Brand: MEDLINE

## (undated) DEVICE — SOL IRR NACL 0.9PCT 3000ML

## (undated) DEVICE — PAD,ABDOMINAL,5"X9",STERILE,LF,1/PK: Brand: MEDLINE INDUSTRIES, INC.

## (undated) DEVICE — GLV SURG SENSICARE PI ORTHO SZ7.5 LF STRL

## (undated) DEVICE — PK SHLDR ARTHROSCOPY 50

## (undated) DEVICE — GLV SURG SENSICARE PI ORTHO SZ8 LF STRL

## (undated) DEVICE — CANN TRPL DAM 7X7MM